# Patient Record
Sex: FEMALE | Race: WHITE | Employment: FULL TIME | URBAN - METROPOLITAN AREA
[De-identification: names, ages, dates, MRNs, and addresses within clinical notes are randomized per-mention and may not be internally consistent; named-entity substitution may affect disease eponyms.]

---

## 2018-12-04 ENCOUNTER — HOSPITAL ENCOUNTER (OUTPATIENT)
Dept: MAMMOGRAPHY | Age: 53
Discharge: HOME | End: 2018-12-06
Payer: COMMERCIAL

## 2018-12-04 DIAGNOSIS — Z12.31 SCREENING MAMMOGRAM, ENCOUNTER FOR: ICD-10-CM

## 2018-12-04 PROCEDURE — 77063 BREAST TOMOSYNTHESIS BI: CPT

## 2019-06-12 ENCOUNTER — TELEPHONE (OUTPATIENT)
Dept: ORTHOPEDIC SURGERY | Age: 54
End: 2019-06-12

## 2019-06-12 DIAGNOSIS — G56.03 BILATERAL CARPAL TUNNEL SYNDROME: Primary | ICD-10-CM

## 2019-06-13 ENCOUNTER — OFFICE VISIT (OUTPATIENT)
Dept: ORTHOPEDIC SURGERY | Age: 54
End: 2019-06-13
Payer: COMMERCIAL

## 2019-06-13 VITALS
DIASTOLIC BLOOD PRESSURE: 86 MMHG | WEIGHT: 225 LBS | SYSTOLIC BLOOD PRESSURE: 125 MMHG | RESPIRATION RATE: 16 BRPM | HEART RATE: 87 BPM | BODY MASS INDEX: 35.31 KG/M2 | HEIGHT: 67 IN

## 2019-06-13 DIAGNOSIS — M18.11 DEGENERATIVE ARTHRITIS OF THUMB, RIGHT: ICD-10-CM

## 2019-06-13 DIAGNOSIS — G56.02 LEFT CARPAL TUNNEL SYNDROME: Primary | ICD-10-CM

## 2019-06-13 PROCEDURE — 20526 THER INJECTION CARP TUNNEL: CPT | Performed by: ORTHOPAEDIC SURGERY

## 2019-06-13 PROCEDURE — 99203 OFFICE O/P NEW LOW 30 MIN: CPT | Performed by: ORTHOPAEDIC SURGERY

## 2019-06-13 RX ORDER — BETAMETHASONE SODIUM PHOSPHATE AND BETAMETHASONE ACETATE 3; 3 MG/ML; MG/ML
6 INJECTION, SUSPENSION INTRA-ARTICULAR; INTRALESIONAL; INTRAMUSCULAR; SOFT TISSUE ONCE
Status: COMPLETED | OUTPATIENT
Start: 2019-06-13 | End: 2019-06-13

## 2019-06-13 RX ADMIN — BETAMETHASONE SODIUM PHOSPHATE AND BETAMETHASONE ACETATE 6 MG: 3; 3 INJECTION, SUSPENSION INTRA-ARTICULAR; INTRALESIONAL; INTRAMUSCULAR; SOFT TISSUE at 08:43

## 2019-06-13 SDOH — HEALTH STABILITY: MENTAL HEALTH: HOW OFTEN DO YOU HAVE A DRINK CONTAINING ALCOHOL?: MONTHLY OR LESS

## 2019-06-13 NOTE — PROGRESS NOTES
Department of Orthopedic Surgery  History and Physical      CHIEF COMPLAINT: left hand numbness and tingling, right thumb pain    HISTORY OF PRESENT ILLNESS:                The patient is a RHD 48 y.o. female who presents with left hand numbness and tingling and right thumb pain. Patient states for the last 2 to 3 months she has had numbness and tingling and pain to her left hand. She also reports pain. She reports her symptoms are only at night. She does have nightly nocturnal symptoms. She has tried bracing with minimal improvement of her symptoms. She also reports occasional pain to the radial aspect of her MCP of her thumb. She reports no injury. She reports no numbness or tingling on the right. She is an . Patient had an EMG nerve conduction study test.  This is dated April 18, 2019. Right median nerve sensory latency was 3.40 and left was 4.15. Left median nerve motor latency was 4.65 and right was 4.05. On the EMG portion of the exam there was 1+ PSW to the left APB. This is consistent with left severe CTS and right mild CTS. Past Medical History:        Diagnosis Date    Arthritis     right knee      Past Surgical History:        Procedure Laterality Date    BREAST CYST EXCISION      HYSTERECTOMY      2011    KNEE ARTHROSCOPY      KNEE SURGERY Right      Current Medications:   No current facility-administered medications for this visit. Allergies:  Patient has no known allergies. Social History:   TOBACCO:   reports that she has never smoked. She has never used smokeless tobacco.  ETOH:   reports that she drinks alcohol. DRUGS:   has no drug history on file. ACTIVITIES OF DAILY LIVING:    OCCUPATION:    Family History:   No family history on file.     REVIEW OF SYSTEMS:  CONSTITUTIONAL:  negative  EYES:  negative  HEENT:  negative  RESPIRATORY:  negative  CARDIOVASCULAR:  negative  GASTROINTESTINAL:  negative  GENITOURINARY:  negative  INTEGUMENT/BREAST: negative  HEMATOLOGIC/LYMPHATIC:  negative  ALLERGIC/IMMUNOLOGIC:  negative  ENDOCRINE:  negative  MUSCULOSKELETAL:  pain  NEUROLOGICAL:  N/T  BEHAVIOR/PSYCH:  negative    PHYSICAL EXAM:    VITALS:  Resp 16   Ht 5' 7\" (1.702 m)   Wt 225 lb (102.1 kg)   BMI 35.24 kg/m²   CONSTITUTIONAL:  awake, alert, cooperative, no apparent distress, and appears stated age  EYES:  Lids and lashes normal, pupils equal, round and reactive to light, extra ocular muscles intact, sclera clear, conjunctiva normal  ENT:  Normocephalic, without obvious abnormality, atraumatic, sinuses nontender on palpation, external ears without lesions, oral pharynx with moist mucus membranes, tonsils without erythema or exudates, gums normal and good dentition. NECK:  Supple, symmetrical, trachea midline, no adenopathy, thyroid symmetric, not enlarged and no tenderness, skin normal  NEUROLOGIC:  Awake, alert, oriented to name, place and time. Cranial nerves II-XII are grossly intact. Motor is 5 out of 5 bilaterally. Sensory is intact.  gait is normal.  MUSCULOSKELETAL:    Left upper extremity: Nontender about the shoulder elbow good range of motion. Negative Tinel's of the cubital tunnel, negative Tinel's of the carpal tunnel, positive Fred's, negative Finkelstein's, negative CMC grind, negative tenderness over the A1 pulleys with no active triggering. Negative Wartenberg's and cross finger testing. APB strength 5 out of 5 with no atrophy. Full flexion-extension of the fingers. Median, ulnar, radial nerves intact light touch. Brisk capillary refill. Gross motor 5 out of 5. Right upper extremity: Nontender about the shoulder elbow good range of motion. Negative Tinel's of the cubital tunnel, negative Tinel's of the carpal tunnel, negative Fred's, negative Finkelstein's, negative CMC grind, negative tenderness over the A1 pulleys with no active triggering. Mild tenderness over the radial aspect of the thumb MCP joint.  No

## 2019-11-08 ENCOUNTER — HOSPITAL ENCOUNTER (OUTPATIENT)
Age: 54
Discharge: HOME | End: 2019-11-10
Payer: COMMERCIAL

## 2019-11-08 LAB
ALBUMIN SERPL-MCNC: 4.5 G/DL (ref 3.5–5.2)
ALP BLD-CCNC: 72 U/L (ref 35–104)
ALT SERPL-CCNC: 36 U/L (ref 0–32)
ANION GAP SERPL CALCULATED.3IONS-SCNC: 11 MMOL/L (ref 7–16)
AST SERPL-CCNC: 24 U/L (ref 0–31)
BASOPHILS ABSOLUTE: 0.03 E9/L (ref 0–0.2)
BASOPHILS RELATIVE PERCENT: 0.5 % (ref 0–2)
BILIRUB SERPL-MCNC: 0.6 MG/DL (ref 0–1.2)
BUN BLDV-MCNC: 15 MG/DL (ref 6–20)
CALCIUM SERPL-MCNC: 9.6 MG/DL (ref 8.6–10.2)
CHLORIDE BLD-SCNC: 101 MMOL/L (ref 98–107)
CHOLESTEROL, TOTAL: 215 MG/DL (ref 0–199)
CO2: 24 MMOL/L (ref 22–29)
CREAT SERPL-MCNC: 0.8 MG/DL (ref 0.5–1)
EOSINOPHILS ABSOLUTE: 0.14 E9/L (ref 0.05–0.5)
EOSINOPHILS RELATIVE PERCENT: 2.3 % (ref 0–6)
GFR AFRICAN AMERICAN: >60
GFR NON-AFRICAN AMERICAN: >60 ML/MIN/1.73
GLUCOSE BLD-MCNC: 98 MG/DL (ref 74–99)
HCT VFR BLD CALC: 38.2 % (ref 34–48)
HDLC SERPL-MCNC: 49 MG/DL
HEMOGLOBIN: 12.3 G/DL (ref 11.5–15.5)
IMMATURE GRANULOCYTES #: 0.02 E9/L
IMMATURE GRANULOCYTES %: 0.3 % (ref 0–5)
LDL CHOLESTEROL CALCULATED: 142 MG/DL (ref 0–99)
LYMPHOCYTES ABSOLUTE: 1.04 E9/L (ref 1.5–4)
LYMPHOCYTES RELATIVE PERCENT: 17.2 % (ref 20–42)
MCH RBC QN AUTO: 29.7 PG (ref 26–35)
MCHC RBC AUTO-ENTMCNC: 32.2 % (ref 32–34.5)
MCV RBC AUTO: 92.3 FL (ref 80–99.9)
MONOCYTES ABSOLUTE: 0.5 E9/L (ref 0.1–0.95)
MONOCYTES RELATIVE PERCENT: 8.3 % (ref 2–12)
NEUTROPHILS ABSOLUTE: 4.3 E9/L (ref 1.8–7.3)
NEUTROPHILS RELATIVE PERCENT: 71.4 % (ref 43–80)
PDW BLD-RTO: 12.7 FL (ref 11.5–15)
PLATELET # BLD: 273 E9/L (ref 130–450)
PMV BLD AUTO: 11.5 FL (ref 7–12)
POTASSIUM SERPL-SCNC: 4.2 MMOL/L (ref 3.5–5)
RBC # BLD: 4.14 E12/L (ref 3.5–5.5)
SODIUM BLD-SCNC: 136 MMOL/L (ref 132–146)
TOTAL PROTEIN: 9.1 G/DL (ref 6.4–8.3)
TRIGL SERPL-MCNC: 119 MG/DL (ref 0–149)
TSH SERPL DL<=0.05 MIU/L-ACNC: 3.06 UIU/ML (ref 0.27–4.2)
VITAMIN D 25-HYDROXY: 20 NG/ML (ref 30–100)
VLDLC SERPL CALC-MCNC: 24 MG/DL
WBC # BLD: 6 E9/L (ref 4.5–11.5)

## 2019-11-08 PROCEDURE — 85025 COMPLETE CBC W/AUTO DIFF WBC: CPT

## 2019-11-08 PROCEDURE — 84443 ASSAY THYROID STIM HORMONE: CPT

## 2019-11-08 PROCEDURE — 80053 COMPREHEN METABOLIC PANEL: CPT

## 2019-11-08 PROCEDURE — 82306 VITAMIN D 25 HYDROXY: CPT

## 2019-11-08 PROCEDURE — 80061 LIPID PANEL: CPT

## 2019-12-06 ENCOUNTER — HOSPITAL ENCOUNTER (OUTPATIENT)
Dept: MAMMOGRAPHY | Age: 54
Discharge: HOME | End: 2019-12-08
Payer: COMMERCIAL

## 2019-12-06 DIAGNOSIS — Z12.31 VISIT FOR SCREENING MAMMOGRAM: ICD-10-CM

## 2019-12-06 PROCEDURE — 77063 BREAST TOMOSYNTHESIS BI: CPT

## 2020-01-09 ENCOUNTER — OFFICE VISIT (OUTPATIENT)
Dept: ORTHOPEDIC SURGERY | Age: 55
End: 2020-01-09
Payer: COMMERCIAL

## 2020-01-09 VITALS — HEART RATE: 98 BPM | SYSTOLIC BLOOD PRESSURE: 146 MMHG | RESPIRATION RATE: 18 BRPM | DIASTOLIC BLOOD PRESSURE: 98 MMHG

## 2020-01-09 PROCEDURE — 99214 OFFICE O/P EST MOD 30 MIN: CPT | Performed by: ORTHOPAEDIC SURGERY

## 2020-01-09 PROCEDURE — 20550 NJX 1 TENDON SHEATH/LIGAMENT: CPT | Performed by: ORTHOPAEDIC SURGERY

## 2020-01-09 PROCEDURE — 20526 THER INJECTION CARP TUNNEL: CPT | Performed by: ORTHOPAEDIC SURGERY

## 2020-01-09 RX ORDER — BETAMETHASONE SODIUM PHOSPHATE AND BETAMETHASONE ACETATE 3; 3 MG/ML; MG/ML
18 INJECTION, SUSPENSION INTRA-ARTICULAR; INTRALESIONAL; INTRAMUSCULAR; SOFT TISSUE ONCE
Status: COMPLETED | OUTPATIENT
Start: 2020-01-09 | End: 2020-01-09

## 2020-01-09 RX ADMIN — BETAMETHASONE SODIUM PHOSPHATE AND BETAMETHASONE ACETATE 18 MG: 3; 3 INJECTION, SUSPENSION INTRA-ARTICULAR; INTRALESIONAL; INTRAMUSCULAR; SOFT TISSUE at 15:46

## 2020-01-09 NOTE — PROGRESS NOTES
capillary refill. Gross motor 5 out of 5. DATA:    CBC:   Lab Results   Component Value Date    WBC 6.0 11/08/2019    RBC 4.14 11/08/2019    HGB 12.3 11/08/2019    HCT 38.2 11/08/2019    MCV 92.3 11/08/2019    MCH 29.7 11/08/2019    MCHC 32.2 11/08/2019    RDW 12.7 11/08/2019     11/08/2019    MPV 11.5 11/08/2019     PT/INR:  No results found for: PROTIME, INR    IMPRESSION:  · Left carpal tunnel syndrome  · Left middle and ring finger trigger    PLAN:  Discussed findings with the patient. Discussed conservative and surgical management of the patient. Recommended a course of hand therapy. Patient would also like to proceed with cortisone injections at today's visit along with scheduling surgery. Cortisone injections were provided to the left carpal tunnel, left middle and ring finger A1 pulley. We will plan for a left carpal tunnel release and possible left middle and ring finger trigger release. Post operative course explained to the patient. Patient would like to proceed. All questions answered. Procedure Note Carpal Tunnel Injection    The left carpal tunnel was identified as the injection site. The risk and benefits of a cortisone injection were explained and the patient consented to the injection. Under sterile conditions, the carpal canal was injected with a mixture of 1 mL of 1% Lidocaine and 1 mL of 6 mg/mL Betamethasone without complication. A sterile bandage was applied. Procedure Note Trigger Finger Injection    The left Middle finger A1 pulley and Ring finger A1 pulley was identified as the injection site. The risk and benefits of a cortisone injection were explained and the patient consented to the injection. Under sterile conditions, the digit was injected with a mixture of 1 mL of 1% Lidocaine and 1 mL of 6 mg/mL Betamethasone without complication. A sterile bandage was applied.     I explained the risks, benefits, alternatives and complications of surgery with the patient including but not limited to the risks of infection, possible damage to nerves, vessels, or tendons, stiffness, loss of range of motion, scar sensitivity, wound healing complications, worsening symptoms, possible need for therapy, as well as the possible need further surgery and unanticipated complications. The patient voiced understanding and all questions were answered. The patient elected to proceed with surgical intervention. I have seen and evaluated the patient and agree with the above assessment and plan on today's visit. I have performed the key components of the history and physical examination with significant findings of left carpal tunnel syndrome and trigger fingers. I concur with the findings and plan as documented.     Gilma Doyle MD  1/9/2020

## 2020-01-17 ENCOUNTER — PREP FOR PROCEDURE (OUTPATIENT)
Dept: ORTHOPEDIC SURGERY | Age: 55
End: 2020-01-17

## 2020-01-17 RX ORDER — SODIUM CHLORIDE 0.9 % (FLUSH) 0.9 %
10 SYRINGE (ML) INJECTION EVERY 12 HOURS SCHEDULED
Status: CANCELLED | OUTPATIENT
Start: 2020-01-17

## 2020-01-17 RX ORDER — SODIUM CHLORIDE 0.9 % (FLUSH) 0.9 %
10 SYRINGE (ML) INJECTION PRN
Status: CANCELLED | OUTPATIENT
Start: 2020-01-17

## 2020-01-17 RX ORDER — SODIUM CHLORIDE 9 MG/ML
INJECTION, SOLUTION INTRAVENOUS CONTINUOUS
Status: CANCELLED | OUTPATIENT
Start: 2020-01-17

## 2020-01-21 ENCOUNTER — EVALUATION (OUTPATIENT)
Dept: OCCUPATIONAL THERAPY | Age: 55
End: 2020-01-21
Payer: COMMERCIAL

## 2020-01-21 PROCEDURE — 97530 THERAPEUTIC ACTIVITIES: CPT | Performed by: OCCUPATIONAL THERAPIST

## 2020-01-21 PROCEDURE — 97165 OT EVAL LOW COMPLEX 30 MIN: CPT | Performed by: OCCUPATIONAL THERAPIST

## 2020-01-21 NOTE — PROGRESS NOTES
HEP/Ed previously given. Patient understands diagnosis/prognosis and consents to treatment, plan and goals: [x] Yes    [] No       Electronically signed by: Sanjiv Aquino OT/RISHI   [id number]      EDVRTLMANISH'CONSTANZA Certification / Comments     Frequency/Duration 1x / week for 4 visits. Certification period From: 1-21-20  To: 4-21-20    I have reviewed the Plan of Care established for skilled therapy services and certify that the services are required and that they will be provided while the patient is under my care. Physician's Comments/Revisions:         Physician's Printed Name:  Dr Philomena Lora                                         Physician's Signature:                                                               Date:       Please review Patient's OT evaluation and if you agree sign/date and fax back to us at our [facility]  fax # [phone].

## 2020-01-23 PROBLEM — G56.02 LEFT CARPAL TUNNEL SYNDROME: Status: ACTIVE | Noted: 2020-01-23

## 2020-03-06 ENCOUNTER — HOSPITAL ENCOUNTER (OUTPATIENT)
Age: 55
Setting detail: OUTPATIENT SURGERY
Discharge: HOME | End: 2020-03-06
Attending: ORTHOPAEDIC SURGERY | Admitting: ORTHOPAEDIC SURGERY
Payer: COMMERCIAL

## 2020-03-06 ENCOUNTER — ANESTHESIA (OUTPATIENT)
Dept: OPERATING ROOM | Age: 55
End: 2020-03-06
Payer: COMMERCIAL

## 2020-03-06 ENCOUNTER — ANESTHESIA EVENT (OUTPATIENT)
Dept: OPERATING ROOM | Age: 55
End: 2020-03-06
Payer: COMMERCIAL

## 2020-03-06 VITALS
OXYGEN SATURATION: 96 % | RESPIRATION RATE: 14 BRPM | WEIGHT: 225 LBS | HEART RATE: 96 BPM | TEMPERATURE: 96.8 F | HEIGHT: 67 IN | DIASTOLIC BLOOD PRESSURE: 79 MMHG | SYSTOLIC BLOOD PRESSURE: 126 MMHG | BODY MASS INDEX: 35.31 KG/M2

## 2020-03-06 VITALS — SYSTOLIC BLOOD PRESSURE: 104 MMHG | OXYGEN SATURATION: 99 % | DIASTOLIC BLOOD PRESSURE: 61 MMHG

## 2020-03-06 PROCEDURE — 6360000002 HC RX W HCPCS: Performed by: NURSE ANESTHETIST, CERTIFIED REGISTERED

## 2020-03-06 PROCEDURE — 7100000010 HC PHASE II RECOVERY - FIRST 15 MIN: Performed by: ORTHOPAEDIC SURGERY

## 2020-03-06 PROCEDURE — 3600000012 HC SURGERY LEVEL 2 ADDTL 15MIN: Performed by: ORTHOPAEDIC SURGERY

## 2020-03-06 PROCEDURE — 6360000002 HC RX W HCPCS: Performed by: PHYSICIAN ASSISTANT

## 2020-03-06 PROCEDURE — 2709999900 HC NON-CHARGEABLE SUPPLY: Performed by: ORTHOPAEDIC SURGERY

## 2020-03-06 PROCEDURE — 64721 CARPAL TUNNEL SURGERY: CPT | Performed by: ORTHOPAEDIC SURGERY

## 2020-03-06 PROCEDURE — 2580000003 HC RX 258: Performed by: PHYSICIAN ASSISTANT

## 2020-03-06 PROCEDURE — 3700000001 HC ADD 15 MINUTES (ANESTHESIA): Performed by: ORTHOPAEDIC SURGERY

## 2020-03-06 PROCEDURE — 2500000003 HC RX 250 WO HCPCS: Performed by: ORTHOPAEDIC SURGERY

## 2020-03-06 PROCEDURE — 3600000002 HC SURGERY LEVEL 2 BASE: Performed by: ORTHOPAEDIC SURGERY

## 2020-03-06 PROCEDURE — 3700000000 HC ANESTHESIA ATTENDED CARE: Performed by: ORTHOPAEDIC SURGERY

## 2020-03-06 PROCEDURE — 7100000011 HC PHASE II RECOVERY - ADDTL 15 MIN: Performed by: ORTHOPAEDIC SURGERY

## 2020-03-06 RX ORDER — LIDOCAINE HYDROCHLORIDE AND EPINEPHRINE 10; 10 MG/ML; UG/ML
INJECTION, SOLUTION INFILTRATION; PERINEURAL PRN
Status: DISCONTINUED | OUTPATIENT
Start: 2020-03-06 | End: 2020-03-06 | Stop reason: ALTCHOICE

## 2020-03-06 RX ORDER — HYDROCODONE BITARTRATE AND ACETAMINOPHEN 5; 325 MG/1; MG/1
1 TABLET ORAL
Status: DISCONTINUED | OUTPATIENT
Start: 2020-03-06 | End: 2020-03-06 | Stop reason: HOSPADM

## 2020-03-06 RX ORDER — SODIUM CHLORIDE 9 MG/ML
INJECTION, SOLUTION INTRAVENOUS CONTINUOUS
Status: DISCONTINUED | OUTPATIENT
Start: 2020-03-06 | End: 2020-03-06 | Stop reason: HOSPADM

## 2020-03-06 RX ORDER — SODIUM CHLORIDE 0.9 % (FLUSH) 0.9 %
10 SYRINGE (ML) INJECTION PRN
Status: DISCONTINUED | OUTPATIENT
Start: 2020-03-06 | End: 2020-03-06 | Stop reason: HOSPADM

## 2020-03-06 RX ORDER — PROPOFOL 10 MG/ML
INJECTION, EMULSION INTRAVENOUS PRN
Status: DISCONTINUED | OUTPATIENT
Start: 2020-03-06 | End: 2020-03-06 | Stop reason: SDUPTHER

## 2020-03-06 RX ORDER — FENTANYL CITRATE 50 UG/ML
INJECTION, SOLUTION INTRAMUSCULAR; INTRAVENOUS PRN
Status: DISCONTINUED | OUTPATIENT
Start: 2020-03-06 | End: 2020-03-06 | Stop reason: SDUPTHER

## 2020-03-06 RX ORDER — MIDAZOLAM HYDROCHLORIDE 1 MG/ML
INJECTION INTRAMUSCULAR; INTRAVENOUS PRN
Status: DISCONTINUED | OUTPATIENT
Start: 2020-03-06 | End: 2020-03-06 | Stop reason: SDUPTHER

## 2020-03-06 RX ORDER — HYDROCODONE BITARTRATE AND ACETAMINOPHEN 5; 325 MG/1; MG/1
1 TABLET ORAL EVERY 6 HOURS PRN
Qty: 12 TABLET | Refills: 0 | Status: SHIPPED | OUTPATIENT
Start: 2020-03-06 | End: 2020-03-13

## 2020-03-06 RX ORDER — PROPOFOL 10 MG/ML
INJECTION, EMULSION INTRAVENOUS CONTINUOUS PRN
Status: DISCONTINUED | OUTPATIENT
Start: 2020-03-06 | End: 2020-03-06 | Stop reason: SDUPTHER

## 2020-03-06 RX ORDER — CEFAZOLIN SODIUM 2 G/50ML
2 SOLUTION INTRAVENOUS
Status: COMPLETED | OUTPATIENT
Start: 2020-03-06 | End: 2020-03-06

## 2020-03-06 RX ORDER — SODIUM CHLORIDE 0.9 % (FLUSH) 0.9 %
10 SYRINGE (ML) INJECTION EVERY 12 HOURS SCHEDULED
Status: DISCONTINUED | OUTPATIENT
Start: 2020-03-06 | End: 2020-03-06 | Stop reason: HOSPADM

## 2020-03-06 RX ADMIN — PROPOFOL 100 MG: 10 INJECTION, EMULSION INTRAVENOUS at 11:27

## 2020-03-06 RX ADMIN — FENTANYL CITRATE 50 MCG: 50 INJECTION, SOLUTION INTRAMUSCULAR; INTRAVENOUS at 11:27

## 2020-03-06 RX ADMIN — SODIUM CHLORIDE: 9 INJECTION, SOLUTION INTRAVENOUS at 11:25

## 2020-03-06 RX ADMIN — FENTANYL CITRATE 25 MCG: 50 INJECTION, SOLUTION INTRAMUSCULAR; INTRAVENOUS at 11:34

## 2020-03-06 RX ADMIN — MIDAZOLAM 2 MG: 1 INJECTION INTRAMUSCULAR; INTRAVENOUS at 11:25

## 2020-03-06 RX ADMIN — CEFAZOLIN SODIUM 2 G: 2 SOLUTION INTRAVENOUS at 11:25

## 2020-03-06 RX ADMIN — PROPOFOL 100 MCG/KG/MIN: 10 INJECTION, EMULSION INTRAVENOUS at 11:27

## 2020-03-06 ASSESSMENT — PULMONARY FUNCTION TESTS
PIF_VALUE: 1
PIF_VALUE: 0
PIF_VALUE: 1

## 2020-03-06 NOTE — BRIEF OP NOTE
Brief Postoperative Note  ______________________________________________________________    Patient: Valentina Neff  YOB: 1965  MRN: [id number]  Date of Procedure: 3/6/2020    Pre-Op Diagnosis: LEFT CARPAL TUNNEL SYNDROME, ACQUIRED TRIGGER FINGER OF LEFT MIDDLE AND RING FINGERS    Post-Op Diagnosis: Same       Procedure(s):  LEFT CARPAL TUNNEL RELEASE    Anesthesia: Monitor Anesthesia Care    Surgeon(s):  Basia Rainey MD    Assistant: Howard Burden    Estimated Blood Loss (mL): less than 50     Complications: None    Specimens:   * No specimens in log *    Implants:  * No implants in log *      Drains: * No LDAs found *    Findings: see op note    CHINO Martinez  Date: 3/6/2020  Time: 11:37 AM

## 2020-03-06 NOTE — ANESTHESIA PRE PROCEDURE
Department of Anesthesiology  Preprocedure Note       Name:  Tim Menon   Age:  47 y.o.  :  1965                                          MRN:  [id number]         Date:  3/6/2020      Surgeon: Libby Thomas):  Ayo Bashir MD    Procedure: LEFT CARPAL TUNNEL RELEASE (Left )  POSS LEFT MIDDLE AND RING FINGER TRIGGER RELEASE (Left )    Medications prior to admission:   Prior to Admission medications    Not on File       Current medications:    Current Facility-Administered Medications   Medication Dose Route Frequency Provider Last Rate Last Dose    0.9 % sodium chloride infusion   Intravenous Continuous JeanCHINO Horne        ceFAZolin (ANCEF) 2 g in dextrose 3 % 50 mL IVPB (duplex)  2 g Intravenous On Call to [address] [place]        sodium chloride flush 0.9 % injection 10 mL  10 mL Intravenous 2 times per day Jean CHINO Finnegan        sodium chloride flush 0.9 % injection 10 mL  10 mL Intravenous PRN JeanCHINO Horne           Allergies:  No Known Allergies    Problem List:    Patient Active Problem List   Diagnosis Code    Left carpal tunnel syndrome G56.02       Past Medical History:        Diagnosis Date    Arthritis     right knee     Carpal tunnel syndrome, left        Past Surgical History:        Procedure Laterality Date    BREAST CYST EXCISION      HYSTERECTOMY      2011    KNEE ARTHROSCOPY      KNEE SURGERY Right        Social History:    Social History     Tobacco Use    Smoking status: Never Smoker    Smokeless tobacco: Never Used   Substance Use Topics    Alcohol use: Yes     Frequency: Monthly or less     Comment: occasional                                 Counseling given: Not Answered      Vital Signs (Current):   Vitals:    20 1134 20 0900   BP:  (!) 146/93   Pulse:  87   Resp:  14   SpO2:  98%   Weight: 225 lb (102.1 kg)    Height: 5' 7\" (1.702 m)                                               BP Readings from Last 3 Encounters:   20 (!) 146/93

## 2020-03-06 NOTE — OP NOTE
DATE OF PROCEDURE: 3/6/2020  SURGEON: Ashley Bolton M.D.  ASSISTANT: Dr Sera Dominguez orthopedic resident  PREOPERATIVE DIAGNOSIS: left carpal tunnel syndrome. POSTOPERATIVE DIAGNOSIS: left carpal tunnel syndrome. OPERATION: left carpal tunnel release. ANESTHESIA:  1. Monitored anesthesia care  2. Local anesthetic by surgeon consisting of 10cc of 1% lidocaine with epinephrine  ESTIMATED BLOOD LOSS: Minimal  COMPLICATIONS: None. TOTAL TOURNIQUET TIME: Approximately 6 minutes, 250 mm brachial tourniquet. DISPOSITION: The patient was stable throughout the procedure. FINDINGS:  1. Evidence of median nerve compression beneath the transverse carpal  ligament. It was adequately decompressed with release of the transverse  carpal ligament. 2. Status post decompression, the median nerve was fully decompressed  throughout its course including distal forearm fascia through the carpal  tunnel to its trifurcation distally  OPERATIVE INDICATION: The patient is a very pleasant patient with  persistent and recalcitrant of carpal tunnel syndrome. After failing  conservative management, she wished to proceed with carpal tunnel release. Risks, benefits, alternatives, and complications were fully explained to her  including, but not limited to, the risk of infection, damage to  nerves/vessels/tendons, failure to relieve her symptoms, worsening symptoms,  recurrence of symptoms, pillar pain, thenar pain, hypothenar pain, scar sensitivity as well as unforeseen complications. She voiced her understanding and wished to proceed. PROCEDURE IN DETAIL: The patient was identified in the holding area. The  left hand was identified as the operative site. She was then seen by  Anesthesia, taken to the operating room, placed supine on the table, and  underwent monitored anesthesia care per Anesthesia Department.  Under sterile  conditions, approximately 10 mL of 1:1 mixture of 1% lidocaine with epinephrine were infiltrated over the

## 2020-03-06 NOTE — H&P
and FDS intact to all fingers. Median, ulnar, radial nerves intact light touch. Diminished sensation to the median n distribution. Brisk capillary refill. Gross motor 5 out of 5.        DATA:    CBC:         Lab Results   Component Value Date     WBC 6.0 11/08/2019     RBC 4.14 11/08/2019     HGB 12.3 11/08/2019     HCT 38.2 11/08/2019     MCV 92.3 11/08/2019     MCH 29.7 11/08/2019     MCHC 32.2 11/08/2019     RDW 12.7 11/08/2019      11/08/2019     MPV 11.5 11/08/2019      PT/INR:  No results found for: PROTIME, INR     IMPRESSION:  · Left carpal tunnel syndrome  · Left middle and ring finger trigger     PLAN:  Discussed findings with the patient. Discussed conservative and surgical management of the patient. Recommended a course of hand therapy. Patient would also like to proceed with cortisone injections at today's visit along with scheduling surgery. Cortisone injections were provided to the left carpal tunnel, left middle and ring finger A1 pulley. We will plan for a left carpal tunnel release and possible left middle and ring finger trigger release. Post operative course explained to the patient. Patient would like to proceed. All questions answered.       I explained the risks, benefits, alternatives and complications of surgery with the patient including but not limited to the risks of infection, possible damage to nerves, vessels, or tendons, stiffness, loss of range of motion, scar sensitivity, wound healing complications, worsening symptoms, possible need for therapy, as well as the possible need further surgery and unanticipated complications. The patient voiced understanding and all questions were answered. The patient elected to proceed with surgical intervention.      I have seen and evaluated the patient and agree with the above assessment and plan on today's visit.  I have performed the key components of the history and physical examination with significant findings of left carpal tunnel syndrome and trigger fingers. I concur with the findings and plan as documented    History and Physical Update     Patient was seen and examined. Patient's history and physical was reviewed with the patient. There has been no significant interval changes.

## 2020-03-06 NOTE — PROGRESS NOTES
Discharge instructions provided. Verbalized understanding. denies pain.
Nourishment provided. Vs stable, denies pain. Family at bedside.
products on the day of surgery    [x] If not already done, you can expect a call from registration    [x] You can expect a call the business day prior to procedure to notify you if your arrival time changes    [] If you receive a survey after surgery we would greatly appreciate your comments    [] Parent/guardian of a minor must accompany their child and remain on the premises  the entire time they are under our care     [] Pediatric patients may bring favorite toy, blanket or comfort item with them    [] A caregiver or family member must remain with the patient during their stay if they are mentally handicapped, have dementia, disoriented or unable to use a call light or would be a safety concern if left unattended    [x] Please notify surgeon if you develop any illness between now and time of surgery (cold, cough, sore throat, fever, nausea, vomiting) or any signs of infections  including skin, wounds, and dental.    [x]  [facility] is available to fill your prescription here on your day of surgery, ask your preop nurse for details    [] Other instructions  EDUCATIONAL MATERIALS PROVIDED:    [] PAT Preoperative Education Packet/Booklet     [] Medication List    [] Fluoroscopy Information Pamphlet    [] Transfusion bracelet applied with instructions    [] Joint replacement video reviewed    [] Shower with soap, lather and rinse well, and use CHG wipes provided the evening before surgery as instructed

## 2020-03-13 ENCOUNTER — OFFICE VISIT (OUTPATIENT)
Dept: ORTHOPEDIC SURGERY | Age: 55
End: 2020-03-13

## 2020-03-13 VITALS — DIASTOLIC BLOOD PRESSURE: 86 MMHG | HEART RATE: 92 BPM | SYSTOLIC BLOOD PRESSURE: 138 MMHG | RESPIRATION RATE: 14 BRPM

## 2020-03-13 PROCEDURE — 99024 POSTOP FOLLOW-UP VISIT: CPT | Performed by: PHYSICIAN ASSISTANT

## 2020-03-13 NOTE — PROGRESS NOTES
HPI: Patient presents today POD #7 s/p left carpal tunnel release. Overall she is doing well. Preoperative symptoms have improved. Physical Exam: incision c/d/i with sutures in place. No erythema or signs of infection. Full flexion and extension of the fingers. - pillar pain. Brisk capillary refill. Otherwise NVI. Assessment: POD #7 s/p left endoscopic carpal tunnel release    Plan:   Sutures removed today in the office. Scar management advised. Activity restrictions reviewed with the patient. HEP and ROM exercises demonstrated to the patient. Follow up in 1 month if needed. All questions answered.

## 2020-06-08 ENCOUNTER — OFFICE VISIT (OUTPATIENT)
Dept: ORTHOPEDIC SURGERY | Age: 55
End: 2020-06-08
Payer: COMMERCIAL

## 2020-06-08 VITALS — WEIGHT: 225 LBS | HEIGHT: 67 IN | TEMPERATURE: 97 F | BODY MASS INDEX: 35.31 KG/M2 | RESPIRATION RATE: 20 BRPM

## 2020-06-08 PROCEDURE — 99212 OFFICE O/P EST SF 10 MIN: CPT | Performed by: ORTHOPAEDIC SURGERY

## 2021-03-12 ENCOUNTER — IMMUNIZATION (OUTPATIENT)
Dept: PRIMARY CARE CLINIC | Age: 56
End: 2021-03-12
Payer: COMMERCIAL

## 2021-03-12 PROCEDURE — 0011A COVID-19, MODERNA VACCINE 100MCG/0.5ML DOSE: CPT | Performed by: NURSE PRACTITIONER

## 2021-03-12 PROCEDURE — 91301 COVID-19, MODERNA VACCINE 100MCG/0.5ML DOSE: CPT | Performed by: NURSE PRACTITIONER

## 2021-04-12 ENCOUNTER — IMMUNIZATION (OUTPATIENT)
Dept: PRIMARY CARE CLINIC | Age: 56
End: 2021-04-12
Payer: COMMERCIAL

## 2021-04-12 PROCEDURE — 91301 COVID-19, MODERNA VACCINE 100MCG/0.5ML DOSE: CPT | Performed by: NURSE PRACTITIONER

## 2021-04-12 PROCEDURE — 0012A COVID-19, MODERNA VACCINE 100MCG/0.5ML DOSE: CPT | Performed by: NURSE PRACTITIONER

## 2021-04-28 ENCOUNTER — HOSPITAL ENCOUNTER (OUTPATIENT)
Dept: MAMMOGRAPHY | Age: 56
Discharge: HOME | End: 2021-04-30
Payer: COMMERCIAL

## 2021-04-28 VITALS — HEIGHT: 67 IN | BODY MASS INDEX: 35.31 KG/M2 | WEIGHT: 225 LBS

## 2021-04-28 DIAGNOSIS — Z12.31 ENCOUNTER FOR SCREENING MAMMOGRAM FOR MALIGNANT NEOPLASM OF BREAST: ICD-10-CM

## 2021-04-28 PROCEDURE — 77063 BREAST TOMOSYNTHESIS BI: CPT

## 2021-05-05 ENCOUNTER — HOSPITAL ENCOUNTER (OUTPATIENT)
Dept: ULTRASOUND IMAGING | Age: 56
Discharge: HOME | End: 2021-05-05
Payer: COMMERCIAL

## 2021-05-05 ENCOUNTER — HOSPITAL ENCOUNTER (OUTPATIENT)
Dept: MAMMOGRAPHY | Age: 56
Discharge: HOME | End: 2021-05-07
Payer: COMMERCIAL

## 2021-05-05 DIAGNOSIS — R92.8 ABNORMAL MAMMOGRAM: ICD-10-CM

## 2021-05-05 PROCEDURE — G0279 TOMOSYNTHESIS, MAMMO: HCPCS

## 2021-05-05 PROCEDURE — 76641 ULTRASOUND BREAST COMPLETE: CPT

## 2021-11-09 ENCOUNTER — HOSPITAL ENCOUNTER (OUTPATIENT)
Dept: MAMMOGRAPHY | Age: 56
Discharge: HOME | End: 2021-11-11
Payer: COMMERCIAL

## 2021-11-09 ENCOUNTER — HOSPITAL ENCOUNTER (OUTPATIENT)
Dept: ULTRASOUND IMAGING | Age: 56
End: 2021-11-09
Payer: COMMERCIAL

## 2021-11-09 VITALS — BODY MASS INDEX: 34.53 KG/M2 | WEIGHT: 220 LBS | HEIGHT: 67 IN

## 2021-11-09 DIAGNOSIS — R92.8 ABNORMAL MAMMOGRAM: ICD-10-CM

## 2021-11-09 DIAGNOSIS — Z09 FOLLOW-UP EXAM, 3-6 MONTHS SINCE PREVIOUS EXAM: ICD-10-CM

## 2021-11-09 PROCEDURE — 77065 DX MAMMO INCL CAD UNI: CPT

## 2022-09-28 ENCOUNTER — HOSPITAL ENCOUNTER (OUTPATIENT)
Dept: MAMMOGRAPHY | Age: 57
Discharge: HOME | End: 2022-09-30
Payer: COMMERCIAL

## 2022-09-28 DIAGNOSIS — Z12.31 ENCOUNTER FOR SCREENING MAMMOGRAM FOR MALIGNANT NEOPLASM OF BREAST: ICD-10-CM

## 2022-09-28 PROCEDURE — 77063 BREAST TOMOSYNTHESIS BI: CPT

## 2022-09-30 ENCOUNTER — TELEPHONE (OUTPATIENT)
Dept: MAMMOGRAPHY | Age: 57
End: 2022-09-30

## 2022-09-30 NOTE — TELEPHONE ENCOUNTER
Order request faxed to Dr. Katrin Leo for right breast diagnostic mammogram and right breast ultrasound as recommended from mammogram performed at [address] [place] [address],[address] on 9/28/22. Successful fax confirmation.  BIRDIE CrawfordN, RN -Breast Nurse Navigator

## 2022-10-05 ENCOUNTER — TELEPHONE (OUTPATIENT)
Dept: MAMMOGRAPHY | Age: 57
End: 2022-10-05

## 2022-10-05 NOTE — TELEPHONE ENCOUNTER
Call to patient in reference to her mammogram performed at [facility] on 9/28/22. Instructed patient that the radiologist has recommended additional breast imaging in order to make a final determination and further recommendations. Patient verbalized understanding and is agreeable to proceed. Orders received per Dr. Merary Pimentel and scanned into media.  Patient scheduled for 10/14/22 at 9:15 am. BIRDIE RogelN, RN -Breast Nurse Navigator

## 2022-10-14 ENCOUNTER — HOSPITAL ENCOUNTER (OUTPATIENT)
Dept: MAMMOGRAPHY | Age: 57
Discharge: HOME | End: 2022-10-16
Payer: COMMERCIAL

## 2022-10-14 ENCOUNTER — HOSPITAL ENCOUNTER (OUTPATIENT)
Dept: ULTRASOUND IMAGING | Age: 57
Discharge: HOME | End: 2022-10-14
Payer: COMMERCIAL

## 2022-10-14 DIAGNOSIS — R92.8 ABNORMAL MAMMOGRAM: ICD-10-CM

## 2022-10-14 PROCEDURE — 76642 ULTRASOUND BREAST LIMITED: CPT

## 2022-10-14 PROCEDURE — G0279 TOMOSYNTHESIS, MAMMO: HCPCS

## 2023-09-30 NOTE — PROGRESS NOTES
April 12, 2023       Grecia Hogue MD  [address]  [address]  [facility] [address]  Via Fax: [phone]      Patient: Kasey Nuñez   YOB: 2018   Date of Visit: 4/12/2023       Dear Dr. Hogue:    I saw your patient, Kasey Nuñez, for an evaluation. Below are my notes for this visit with her.    If you have questions, please do not hesitate to call me.      Sincerely,        Dejon Fortune MD        CC: No Recipients  Dejon Fortune MD  4/12/2023 12:59 PM  Sign when Signing Visit  4/12/2023    Grecia Hogue MD    Chief Complaint   Patient presents with   • Consultation   • Chest Pain   • Tachycardia       Problem List Items Addressed This Visit    None  Visit Diagnoses     Chest pain, unspecified type    -  Primary    Relevant Orders    Electrocardiogram 12-Lead (Completed)    TRANSTHORACIC ECHO (TTE) COMPLETE (PEDS)    Tachycardia        Relevant Orders    Electrocardiogram 12-Lead (Completed)    TRANSTHORACIC ECHO (TTE) COMPLETE (PEDS)          HPI:    Kasey Nuñez is a 4 year old female who was accompanied by her mother at the [facility] for initial evaluation of chest pain and tachycardia.     She was seen by Dr. John on 4-11-23, when the following was noted:  Chief Complaint: Here today for Concerns (Rapid heart rate at dance. She went up to dance teacher and said her heart hurt- Hr-128bpm. Took Claritin today, Flonase and Benadryl last night.)   HPI  Kasey Nuñez is a 5YO female here with concern for rapid heart rate. It was necessary to utilize an independent historian ( mother ) during this office visit to provide a complete and reliable history due to the patient's age. Felt that heart rate was elevated at dance class, found to be 128 when measured. Mentions that her heart \"hurts\", now improved in the office. Pain was over right side of the chest. No issues with heart problems or racing heart in the past. Mentions possible feeling skipped beats.   No  changes in eating/drinking recently, no caffeine. Eating and drinking well, no vomiting/diarrhea. Mom was sick last week, now patient with cough over the last day or so. Heavier breather when getting cough. No fevers noted at home. Medications tried at home include Flonase and Benadryl and Claritin. No rashes.  Current Outpatient Medications   Azithromycin 200 MG/5ML PO Recon Susp 4ml day one then 2 ml days 2-5     Kasey has had right-sided chest pain that started 1 day ago in dance class, described as \"it hurts, like someone hit me, burning,\" grade 10/10, does not radiate , initially associated with exertion (started during dance class). She was not exercising much at dance class, and had a heart rate of 128 BPM. Mom picked her up, and the pain lasted for over an hour. She was brought to the doctors office for evaluation.    The lasted on and off for the past day, lasting up to hours, and occured multiple times since yesterday, including waking her from sleep at 1:30 AM today. Mom checked her heart rate, which ranged from 114 to 126.     She has had intermittent cough sine early childhood. At 18 months of age, she was in [place] when she developed a cough, seen in urgent care and ER, given nebulizer with improvement, and she was given inhalers which helped, given for the next several weeks.    In February 2022 she had COVID, and after that, she has had intermittent cough, treated with Claritin, Benadryl, Flonase, without significant improvement.    On 4-10-23, she developed a cough, and her mom had a recent upper respiratory infection with cough.    Kasey has no other cardiovascular symptoms, no palpitations, syncope, dizziness, cyanosis, pallor, and enjoys normal growth, development, and activity.     Kasey is active, and is able to keep up with her friends.    Kasey is in .     Kasey wants to be a mermaid, doctor, or  when she grows up.     Kasey brushes her teeth 2 times per day, and doesn't  floss.    Family History: MGF hypertension, PGM celiac, afib, \"leaky valve,\" MGGF stroke in 60s, otherwise negative for heart problems.    Review of Systems   Constitutional: Positive for fever (low grade temp 99.1 yesterday PM).   HENT: Negative.    Eyes: Negative.    Respiratory: Positive for cough (frequent cough past 3 days).    Cardiovascular: Negative.    Gastrointestinal: Negative.    Endocrine: Negative.    Genitourinary: Negative.    Musculoskeletal: Negative.    Skin: Negative.         Has had dry skin, warts.   Allergic/Immunologic: Negative.         May have seasonal allergies.   Neurological: Negative.    Hematological: Negative.    Psychiatric/Behavioral: Negative.        Current Medications:      Summary of your Discharge Medications      as of April 12, 2023 12:32 PM     You have not been prescribed any medications.         Relevant Past Medical History:  History reviewed. No pertinent past medical history.    History reviewed. No pertinent surgical history.    ALLERGIES:  No Known Allergies     Family History   Problem Relation Age of Onset   • Hypertension Maternal Grandmother        Examination:   Blood pressure (!) 114/74, pulse 129, height 3' 5.34\" (1.05 m), weight 15.7 kg (34 lb 9.8 oz), SpO2 96 %.  Weight    04/12/23 1138   Weight: 15.7 kg (34 lb 9.8 oz)       Physical Exam  Vitals and nursing note reviewed.   Constitutional:       General: She is active.      Appearance: She is well-developed.   HENT:      Nose: Nose normal.      Mouth/Throat:      Mouth: Mucous membranes are moist.      Pharynx: Oropharynx is clear.   Eyes:      Conjunctiva/sclera: Conjunctivae normal.   Cardiovascular:      Rate and Rhythm: Normal rate and regular rhythm.      Pulses: Pulses are strong.      Heart sounds: S1 normal and S2 normal. No murmur heard.  Pulmonary:      Effort: Pulmonary effort is normal. No respiratory distress, nasal flaring or retractions.      Breath sounds: No stridor. Wheezing present.       Comments: Good aeration, occasional mild expiratory wheeze.  Abdominal:      General: There is no distension.      Palpations: Abdomen is soft.      Tenderness: There is no abdominal tenderness.   Musculoskeletal:         General: Normal range of motion.      Cervical back: Normal range of motion.   Skin:     General: Skin is warm and dry.      Coloration: Skin is not jaundiced or pale.      Findings: No petechiae or rash.   Neurological:      Mental Status: She is alert.      Motor: No abnormal muscle tone.         Time: Total face to face time spent with patient 60 minutes. Greater than 50% of the total time was spent counseling and/or coordinating care.    ECG/ECHO:    An ECG 4-12-23 showed sinus tachycardia, , normal axis, left ventricular enlargement by voltage criteria, and was otherwise normal.    An echo 4-12-23 showed normal cardiac anatomy and function.    Assessment/Plan:    Kasey has chest pain, most likely musculoskeletal.    Kasey also has tachycardia, most likely sinus tachycardia (normal fast heart rate) related to acute viral infection.    She has a cough and mild wheezing, possibly related to reactive airway disease associated with possible viral respiratory infection. She may benefit from albuterol inhaler, and I suggested her mom contact her primary care physician to discuss this.    Kasey should do deep breathing exercises 3 times per day for at least 1 week for prevention of chest pain, which were demonstrated during the clinic visit. Kasey can also do this breathing as needed for preventing anxiety, if feeling anxious, or to slow her heart if it is racing. Kasey can review videos of this breathing exercise on YouTube, Dr Tolliver's Deep Breathing.    Kasey should drink plenty of water, at least 2 to 3 bottles of water per day, more at times when she is exercising a lot, drinking caffeine, or in hot weather.    Kasey should not be at increased risk for anesthesia or surgery compared to others  LIMITED LEFT; Future    Screening for vaginal cancer  -     PAP SMEAR; Future    Dense breasts  -     US BREAST LIMITED RIGHT; Future  -     US BREAST LIMITED LEFT; Future        Return in about 1 year (around 10/5/2024), or if symptoms worsen or fail to improve.      Marleny Morrison MD     D in the background population without cardiac problems, and she is clear from a cardiology viewpoint for anesthesia and surgery, and does not need any special precautions. In my opinion, she does not need SBE prophylaxis for surgical or dental procedures.    There should be no restriction placed on her activity from a cardiac standpoint. Kasey can participate in all activities, including competitive sports, but should stop and rest if she does not feel well (self-limited exercise).    Kasey should have an excellent prognosis, and her most important cardiac issues involve leading a healthy lifestyle in the future, eating plenty of fruits and vegetables, whole grain foods, minimal junk food, and exercising regularly.     Kasey should brush and floss her teeth once or twice a day, as this will decrease the chance for gum disease, oral abscess, heart attacks in the future.    Kasey should avoid smoking and vaping in the future.    If she has persistent rapid heart beat despite the above, Kasey's mom should contact me, and we could consider a Holter or event monitor to evaluate for possible arrhythmia.    If she feels better with above, no further cardiology follow-up is necessary unless new findings present at a later date or other questions arise.    I spoke at length with Kasey's mother and Kasey about the above and answered their questions.  Please call me if you have further questions about today's visit, and thanks for allowing me to participate in the care of Kasey.    Send copy to:   Referring Physician  Family    Instructed the family to return to your office.      Dejon Fortune MD

## 2023-10-05 ENCOUNTER — OFFICE VISIT (OUTPATIENT)
Age: 58
End: 2023-10-05
Payer: COMMERCIAL

## 2023-10-05 VITALS
BODY MASS INDEX: 36.32 KG/M2 | SYSTOLIC BLOOD PRESSURE: 150 MMHG | HEIGHT: 66 IN | HEART RATE: 89 BPM | WEIGHT: 226 LBS | DIASTOLIC BLOOD PRESSURE: 96 MMHG

## 2023-10-05 DIAGNOSIS — Z12.31 BREAST CANCER SCREENING BY MAMMOGRAM: Primary | ICD-10-CM

## 2023-10-05 DIAGNOSIS — Z12.72 SCREENING FOR VAGINAL CANCER: ICD-10-CM

## 2023-10-05 DIAGNOSIS — R92.2 DENSE BREASTS: ICD-10-CM

## 2023-10-05 DIAGNOSIS — R92.30 DENSE BREASTS: ICD-10-CM

## 2023-10-05 PROCEDURE — 99396 PREV VISIT EST AGE 40-64: CPT | Performed by: LEGAL MEDICINE

## 2023-10-05 SDOH — ECONOMIC STABILITY: FOOD INSECURITY: WITHIN THE PAST 12 MONTHS, THE FOOD YOU BOUGHT JUST DIDN'T LAST AND YOU DIDN'T HAVE MONEY TO GET MORE.: NEVER TRUE

## 2023-10-05 SDOH — ECONOMIC STABILITY: HOUSING INSECURITY
IN THE LAST 12 MONTHS, WAS THERE A TIME WHEN YOU DID NOT HAVE A STEADY PLACE TO SLEEP OR SLEPT IN A SHELTER (INCLUDING NOW)?: NO

## 2023-10-05 SDOH — ECONOMIC STABILITY: FOOD INSECURITY: WITHIN THE PAST 12 MONTHS, YOU WORRIED THAT YOUR FOOD WOULD RUN OUT BEFORE YOU GOT MONEY TO BUY MORE.: NEVER TRUE

## 2023-10-05 SDOH — ECONOMIC STABILITY: INCOME INSECURITY: HOW HARD IS IT FOR YOU TO PAY FOR THE VERY BASICS LIKE FOOD, HOUSING, MEDICAL CARE, AND HEATING?: NOT HARD AT ALL

## 2023-10-05 ASSESSMENT — ENCOUNTER SYMPTOMS
ABDOMINAL PAIN: 0
ABDOMINAL DISTENTION: 0
RECTAL PAIN: 0
VOMITING: 0
BLOOD IN STOOL: 0
NAUSEA: 0
DIARRHEA: 0
CONSTIPATION: 0

## 2023-10-05 ASSESSMENT — PATIENT HEALTH QUESTIONNAIRE - PHQ9
SUM OF ALL RESPONSES TO PHQ QUESTIONS 1-9: 0
1. LITTLE INTEREST OR PLEASURE IN DOING THINGS: 0

## 2023-10-10 LAB — GYNECOLOGY CYTOLOGY REPORT: NORMAL

## 2023-10-20 ENCOUNTER — HOSPITAL ENCOUNTER (OUTPATIENT)
Dept: ULTRASOUND IMAGING | Age: 58
Discharge: HOME | End: 2023-10-20
Attending: LEGAL MEDICINE
Payer: COMMERCIAL

## 2023-10-20 ENCOUNTER — APPOINTMENT (OUTPATIENT)
Dept: ULTRASOUND IMAGING | Age: 58
End: 2023-10-20
Attending: LEGAL MEDICINE
Payer: COMMERCIAL

## 2023-10-20 ENCOUNTER — HOSPITAL ENCOUNTER (OUTPATIENT)
Dept: MAMMOGRAPHY | Age: 58
Discharge: HOME | End: 2023-10-20
Attending: LEGAL MEDICINE
Payer: COMMERCIAL

## 2023-10-20 ENCOUNTER — APPOINTMENT (OUTPATIENT)
Dept: MAMMOGRAPHY | Age: 58
End: 2023-10-20
Attending: LEGAL MEDICINE
Payer: COMMERCIAL

## 2023-10-20 VITALS — HEIGHT: 69 IN | BODY MASS INDEX: 33.47 KG/M2 | WEIGHT: 225.97 LBS

## 2023-10-20 DIAGNOSIS — Z12.31 BREAST CANCER SCREENING BY MAMMOGRAM: ICD-10-CM

## 2023-10-20 DIAGNOSIS — R92.30 DENSE BREASTS: ICD-10-CM

## 2023-10-20 DIAGNOSIS — R92.2 DENSE BREASTS: ICD-10-CM

## 2023-10-20 PROCEDURE — 77063 BREAST TOMOSYNTHESIS BI: CPT

## 2023-10-20 PROCEDURE — 76641 ULTRASOUND BREAST COMPLETE: CPT

## 2023-10-21 NOTE — RESULT ENCOUNTER NOTE
Please call patient. 10/23/2023. Report shows normal mammogram and benign cysts in both breasts. Radiologist is recommending follow-up mammogram in 1 year.           Forward report to family doctor    Thank you

## 2023-10-24 ENCOUNTER — TELEPHONE (OUTPATIENT)
Age: 58
End: 2023-10-24

## 2023-10-24 NOTE — TELEPHONE ENCOUNTER
----- Message from Scooby Ramos MD sent at 10/21/2023  9:45 AM EDT -----  Please call patient. 10/23/2023. Report shows normal mammogram and benign cysts in both breasts. Radiologist is recommending follow-up mammogram in 1 year.           Forward report to family doctor    Thank you

## 2024-01-05 ENCOUNTER — HOSPITAL ENCOUNTER (OUTPATIENT)
Age: 59
Discharge: HOME | End: 2024-01-05
Payer: COMMERCIAL

## 2024-01-05 LAB
ALBUMIN SERPL-MCNC: 4 G/DL (ref 3.5–5.2)
ALP SERPL-CCNC: 59 U/L (ref 35–104)
ALT SERPL-CCNC: 23 U/L (ref 0–32)
ANION GAP SERPL CALCULATED.3IONS-SCNC: 11 MMOL/L (ref 7–16)
AST SERPL-CCNC: 23 U/L (ref 0–31)
BASOPHILS # BLD: 0 K/UL (ref 0–0.2)
BASOPHILS NFR BLD: 0 % (ref 0–2)
BILIRUB SERPL-MCNC: 0.5 MG/DL (ref 0–1.2)
BILIRUB UR QL STRIP: NEGATIVE
BUN SERPL-MCNC: 13 MG/DL (ref 6–20)
C3 SERPL-MCNC: 61 MG/DL (ref 90–180)
C4 SERPL-MCNC: 3 MG/DL (ref 10–40)
CALCIUM SERPL-MCNC: 8.8 MG/DL (ref 8.6–10.2)
CHLORIDE SERPL-SCNC: 104 MMOL/L (ref 98–107)
CLARITY UR: CLEAR
CO2 SERPL-SCNC: 23 MMOL/L (ref 22–29)
COLOR UR: YELLOW
COMMENT: NORMAL
CREAT SERPL-MCNC: 0.9 MG/DL (ref 0.5–1)
EOSINOPHIL # BLD: 0.05 K/UL (ref 0.05–0.5)
EOSINOPHILS RELATIVE PERCENT: 1 % (ref 0–6)
ERYTHROCYTE [DISTWIDTH] IN BLOOD BY AUTOMATED COUNT: 13.3 % (ref 11.5–15)
GFR SERPL CREATININE-BSD FRML MDRD: >60 ML/MIN/1.73M2
GLUCOSE SERPL-MCNC: 97 MG/DL (ref 74–99)
GLUCOSE UR STRIP-MCNC: NEGATIVE MG/DL
HCT VFR BLD AUTO: 34.2 % (ref 34–48)
HGB BLD-MCNC: 11.2 G/DL (ref 11.5–15.5)
HGB UR QL STRIP.AUTO: NEGATIVE
KETONES UR STRIP-MCNC: NEGATIVE MG/DL
LEUKOCYTE ESTERASE UR QL STRIP: NEGATIVE
LYMPHOCYTES NFR BLD: 0.33 K/UL (ref 1.5–4)
LYMPHOCYTES RELATIVE PERCENT: 6 % (ref 20–42)
MCH RBC QN AUTO: 28.8 PG (ref 26–35)
MCHC RBC AUTO-ENTMCNC: 32.7 G/DL (ref 32–34.5)
MCV RBC AUTO: 87.9 FL (ref 80–99.9)
MONOCYTES NFR BLD: 0.05 K/UL (ref 0.1–0.95)
MONOCYTES NFR BLD: 1 % (ref 2–12)
NEUTROPHILS NFR BLD: 92 % (ref 43–80)
NEUTS SEG NFR BLD: 4.77 K/UL (ref 1.8–7.3)
NITRITE UR QL STRIP: NEGATIVE
PH UR STRIP: 7 [PH] (ref 5–9)
PLATELET # BLD AUTO: 256 K/UL (ref 130–450)
PMV BLD AUTO: 10.1 FL (ref 7–12)
POTASSIUM SERPL-SCNC: 3.9 MMOL/L (ref 3.5–5)
PROT SERPL-MCNC: 8.2 G/DL (ref 6.4–8.3)
PROT UR STRIP-MCNC: NEGATIVE MG/DL
RBC # BLD AUTO: 3.89 M/UL (ref 3.5–5.5)
RBC # BLD: NORMAL 10*6/UL
SODIUM SERPL-SCNC: 138 MMOL/L (ref 132–146)
SP GR UR STRIP: 1.02 (ref 1–1.03)
UROBILINOGEN UR STRIP-ACNC: 0.2 EU/DL (ref 0–1)
WBC OTHER # BLD: 5.2 K/UL (ref 4.5–11.5)

## 2024-01-05 PROCEDURE — 80053 COMPREHEN METABOLIC PANEL: CPT

## 2024-01-05 PROCEDURE — 86160 COMPLEMENT ANTIGEN: CPT

## 2024-01-05 PROCEDURE — 86039 ANTINUCLEAR ANTIBODIES (ANA): CPT

## 2024-01-05 PROCEDURE — 86038 ANTINUCLEAR ANTIBODIES: CPT

## 2024-01-05 PROCEDURE — 83516 IMMUNOASSAY NONANTIBODY: CPT

## 2024-01-05 PROCEDURE — 86235 NUCLEAR ANTIGEN ANTIBODY: CPT

## 2024-01-05 PROCEDURE — 85025 COMPLETE CBC W/AUTO DIFF WBC: CPT

## 2024-01-05 PROCEDURE — 81003 URINALYSIS AUTO W/O SCOPE: CPT

## 2024-01-05 PROCEDURE — 36415 COLL VENOUS BLD VENIPUNCTURE: CPT

## 2024-01-08 LAB
RF IGA SER-ACNC: 11 UNITS
RF IGG SER-ACNC: 6 UNITS
RF IGM SER-ACNC: 19 UNITS

## 2024-01-09 LAB
ANA PAT SER IF-IMP: ABNORMAL
ANA SER QL IA: POSITIVE
ANA TITER: >=640
ENA SM AB SER QL: NEGATIVE
JO-1 ANTIBODY: NEGATIVE
SCLERODERMA (SCL-70) AB: NEGATIVE
SJOGREN'S ANTIBODIES (SSA): POSITIVE
SJOGREN'S ANTIBODIES (SSB): POSITIVE
U1 SNRNP IGG SER-ACNC: NEGATIVE

## 2024-03-31 NOTE — PATIENT INSTRUCTIONS
and your skin. · If your doctor or your physical or occupational therapist tells you to wear a wrist splint, wear it as directed to keep your wrist in a neutral position. This also eases pressure on your median nerve. · Ask your doctor whether you should have physical or occupational therapy to learn how to do tasks differently. · Try a yoga class to stretch your muscles and build strength in your hands and wrists. Yoga has been shown to ease carpal tunnel symptoms. To prevent carpal tunnel  · When working at a computer, keep your hands and wrists in line with your forearms. Hold your elbows close to your sides. Take a break every 10 to 15 minutes. · Try these exercises:  ? Warm up: Rotate your wrist up, down, and from side to side. Repeat this 4 times. Stretch your fingers far apart, relax them, then stretch them again. Repeat 4 times. Stretch your thumb by pulling it back gently, holding it, and then releasing it. Repeat 4 times. ? Prayer stretch: Start with your palms together in front of your chest just below your chin. Slowly lower your hands toward your waistline while keeping your hands close to your stomach and your palms together until you feel a mild to moderate stretch under your forearms. Hold for 10 to 20 seconds. Repeat 4 times. ? Wrist flexor stretch: Hold your arm in front of you with your palm up. Bend your wrist, pointing your hand toward the floor. With your other hand, gently bend your wrist further until you feel a mild to moderate stretch in your forearm. Hold for 10 to 20 seconds. Repeat 4 times. ? Wrist extensor stretch: Repeat the steps for the wrist flexor stretch, but begin with your extended hand palm down. · Squeeze a rubber exercise ball several times a day to keep your hands and fingers strong. · Avoid holding objects (such as a book) in one position for a long time. When possible, use your whole hand to grasp an object.  Using just the thumb and index finger can put stress on painful. 1. Rotate your wrist up, down, and from side to side. Repeat 4 times. 2. Stretch your fingers far apart. Relax them, and then stretch them again. Repeat 4 times. 3. Stretch your thumb by pulling it back gently, holding it, and then releasing it. Repeat 4 times. How to do the exercises  Prayer stretch    1. Start with your palms together in front of your chest just below your chin. 2. Slowly lower your hands toward your waistline, keeping your hands close to your stomach and your palms together until you feel a mild to moderate stretch under your forearms. 3. Hold for at least 15 to 30 seconds. Repeat 2 to 4 times. Wrist flexor stretch    1. Extend your arm in front of you with your palm up. 2. Bend your wrist, pointing your hand toward the floor. 3. With your other hand, gently bend your wrist farther until you feel a mild to moderate stretch in your forearm. 4. Hold for at least 15 to 30 seconds. Repeat 2 to 4 times. Wrist extensor stretch    1. Repeat steps 1 through 4 of the stretch above, but begin with your extended hand palm down. Follow-up care is a key part of your treatment and safety. Be sure to make and go to all appointments, and call your doctor if you are having problems. It's also a good idea to know your test results and keep a list of the medicines you take. Where can you learn more? Go to [email or web address] and sign in to your [employer] account. Enter X792 in the [facility] box to learn more about \"Carpal Tunnel Syndrome: Exercises. \"     If you do not have an account, please click on the \"Sign Up Now\" link. Current as of: September 20, 2018  Content Version: 12.0  © 2747-7274 Healthwise, Incorporated. Care instructions adapted under license by [facility] ([facility]).  If you have questions about a medical condition or this instruction, always ask your healthcare professional. Aubrey Birch any warranty or liability for your use of this information. rt foot

## 2024-04-23 ENCOUNTER — APPOINTMENT (OUTPATIENT)
Dept: GENERAL RADIOLOGY | Age: 59
DRG: 854 | End: 2024-04-23
Payer: COMMERCIAL

## 2024-04-23 ENCOUNTER — HOSPITAL ENCOUNTER (INPATIENT)
Age: 59
LOS: 2 days | Discharge: HOME | DRG: 854 | End: 2024-04-27
Attending: EMERGENCY MEDICINE | Admitting: INTERNAL MEDICINE
Payer: COMMERCIAL

## 2024-04-23 DIAGNOSIS — A41.9 SEPSIS WITHOUT ACUTE ORGAN DYSFUNCTION, DUE TO UNSPECIFIED ORGANISM (HCC): ICD-10-CM

## 2024-04-23 DIAGNOSIS — L03.011 CELLULITIS OF FINGER OF RIGHT HAND: ICD-10-CM

## 2024-04-23 DIAGNOSIS — A41.9 SEPSIS DUE TO CELLULITIS (HCC): Primary | ICD-10-CM

## 2024-04-23 DIAGNOSIS — G89.18 ACUTE POST-OPERATIVE PAIN: ICD-10-CM

## 2024-04-23 DIAGNOSIS — L03.90 CELLULITIS, UNSPECIFIED CELLULITIS SITE: ICD-10-CM

## 2024-04-23 DIAGNOSIS — L03.90 SEPSIS DUE TO CELLULITIS (HCC): Primary | ICD-10-CM

## 2024-04-23 LAB
ALBUMIN SERPL-MCNC: 3.5 G/DL (ref 3.5–5.2)
ALP SERPL-CCNC: 56 U/L (ref 35–104)
ALT SERPL-CCNC: 15 U/L (ref 0–32)
ANION GAP SERPL CALCULATED.3IONS-SCNC: 13 MMOL/L (ref 7–16)
AST SERPL-CCNC: 12 U/L (ref 0–31)
BASOPHILS # BLD: 0.01 K/UL (ref 0–0.2)
BASOPHILS NFR BLD: 0 % (ref 0–2)
BILIRUB SERPL-MCNC: 1.1 MG/DL (ref 0–1.2)
BUN SERPL-MCNC: 11 MG/DL (ref 6–20)
CALCIUM SERPL-MCNC: 8.1 MG/DL (ref 8.6–10.2)
CHLORIDE SERPL-SCNC: 97 MMOL/L (ref 98–107)
CO2 SERPL-SCNC: 19 MMOL/L (ref 22–29)
CREAT SERPL-MCNC: 0.8 MG/DL (ref 0.5–1)
EOSINOPHIL # BLD: 0 K/UL (ref 0.05–0.5)
EOSINOPHILS RELATIVE PERCENT: 0 % (ref 0–6)
ERYTHROCYTE [DISTWIDTH] IN BLOOD BY AUTOMATED COUNT: 14.5 % (ref 11.5–15)
GFR SERPL CREATININE-BSD FRML MDRD: >90 ML/MIN/1.73M2
GLUCOSE SERPL-MCNC: 113 MG/DL (ref 74–99)
HCT VFR BLD AUTO: 30.5 % (ref 34–48)
HGB BLD-MCNC: 10.8 G/DL (ref 11.5–15.5)
IMM GRANULOCYTES # BLD AUTO: 0.07 K/UL (ref 0–0.58)
IMM GRANULOCYTES NFR BLD: 1 % (ref 0–5)
LACTATE BLDV-SCNC: 0.8 MMOL/L (ref 0.5–1.9)
LYMPHOCYTES NFR BLD: 0.72 K/UL (ref 1.5–4)
LYMPHOCYTES RELATIVE PERCENT: 6 % (ref 20–42)
MCH RBC QN AUTO: 29 PG (ref 26–35)
MCHC RBC AUTO-ENTMCNC: 35.4 G/DL (ref 32–34.5)
MCV RBC AUTO: 81.8 FL (ref 80–99.9)
MONOCYTES NFR BLD: 0.56 K/UL (ref 0.1–0.95)
MONOCYTES NFR BLD: 4 % (ref 2–12)
NEUTROPHILS NFR BLD: 90 % (ref 43–80)
NEUTS SEG NFR BLD: 11.56 K/UL (ref 1.8–7.3)
PLATELET # BLD AUTO: 203 K/UL (ref 130–450)
PMV BLD AUTO: 10.3 FL (ref 7–12)
POTASSIUM SERPL-SCNC: 3.8 MMOL/L (ref 3.5–5)
PROT SERPL-MCNC: 7.5 G/DL (ref 6.4–8.3)
RBC # BLD AUTO: 3.73 M/UL (ref 3.5–5.5)
SODIUM SERPL-SCNC: 129 MMOL/L (ref 132–146)
WBC OTHER # BLD: 12.9 K/UL (ref 4.5–11.5)

## 2024-04-23 PROCEDURE — G0378 HOSPITAL OBSERVATION PER HR: HCPCS

## 2024-04-23 PROCEDURE — 96376 TX/PRO/DX INJ SAME DRUG ADON: CPT

## 2024-04-23 PROCEDURE — 96365 THER/PROPH/DIAG IV INF INIT: CPT

## 2024-04-23 PROCEDURE — 99223 1ST HOSP IP/OBS HIGH 75: CPT | Performed by: INTERNAL MEDICINE

## 2024-04-23 PROCEDURE — 85025 COMPLETE CBC W/AUTO DIFF WBC: CPT

## 2024-04-23 PROCEDURE — 83605 ASSAY OF LACTIC ACID: CPT

## 2024-04-23 PROCEDURE — 6360000002 HC RX W HCPCS

## 2024-04-23 PROCEDURE — 86140 C-REACTIVE PROTEIN: CPT

## 2024-04-23 PROCEDURE — 96375 TX/PRO/DX INJ NEW DRUG ADDON: CPT

## 2024-04-23 PROCEDURE — 99285 EMERGENCY DEPT VISIT HI MDM: CPT

## 2024-04-23 PROCEDURE — 80053 COMPREHEN METABOLIC PANEL: CPT

## 2024-04-23 PROCEDURE — 2580000003 HC RX 258

## 2024-04-23 PROCEDURE — 87040 BLOOD CULTURE FOR BACTERIA: CPT

## 2024-04-23 PROCEDURE — 85652 RBC SED RATE AUTOMATED: CPT

## 2024-04-23 PROCEDURE — 73130 X-RAY EXAM OF HAND: CPT

## 2024-04-23 RX ORDER — CLINDAMYCIN PHOSPHATE 900 MG/50ML
900 INJECTION, SOLUTION INTRAVENOUS ONCE
Status: COMPLETED | OUTPATIENT
Start: 2024-04-23 | End: 2024-04-24

## 2024-04-23 RX ORDER — 0.9 % SODIUM CHLORIDE 0.9 %
1000 INTRAVENOUS SOLUTION INTRAVENOUS ONCE
Status: DISCONTINUED | OUTPATIENT
Start: 2024-04-23 | End: 2024-04-27 | Stop reason: HOSPADM

## 2024-04-23 RX ORDER — ONDANSETRON 2 MG/ML
4 INJECTION INTRAMUSCULAR; INTRAVENOUS EVERY 6 HOURS PRN
Status: DISCONTINUED | OUTPATIENT
Start: 2024-04-23 | End: 2024-04-27 | Stop reason: HOSPADM

## 2024-04-23 RX ORDER — PROMETHAZINE HYDROCHLORIDE 25 MG/1
12.5 TABLET ORAL EVERY 6 HOURS PRN
Status: DISCONTINUED | OUTPATIENT
Start: 2024-04-23 | End: 2024-04-27 | Stop reason: HOSPADM

## 2024-04-23 RX ORDER — SODIUM CHLORIDE 9 MG/ML
INJECTION, SOLUTION INTRAVENOUS PRN
Status: DISCONTINUED | OUTPATIENT
Start: 2024-04-23 | End: 2024-04-27 | Stop reason: HOSPADM

## 2024-04-23 RX ORDER — FENTANYL CITRATE 0.05 MG/ML
50 INJECTION, SOLUTION INTRAMUSCULAR; INTRAVENOUS ONCE
Status: COMPLETED | OUTPATIENT
Start: 2024-04-23 | End: 2024-04-23

## 2024-04-23 RX ORDER — POLYETHYLENE GLYCOL 3350 17 G/17G
17 POWDER, FOR SOLUTION ORAL DAILY PRN
Status: DISCONTINUED | OUTPATIENT
Start: 2024-04-23 | End: 2024-04-27 | Stop reason: HOSPADM

## 2024-04-23 RX ORDER — SODIUM CHLORIDE 0.9 % (FLUSH) 0.9 %
10 SYRINGE (ML) INJECTION PRN
Status: DISCONTINUED | OUTPATIENT
Start: 2024-04-23 | End: 2024-04-27 | Stop reason: HOSPADM

## 2024-04-23 RX ORDER — ENOXAPARIN SODIUM 100 MG/ML
30 INJECTION SUBCUTANEOUS 2 TIMES DAILY
Status: DISCONTINUED | OUTPATIENT
Start: 2024-04-23 | End: 2024-04-27 | Stop reason: HOSPADM

## 2024-04-23 RX ORDER — ACETAMINOPHEN 325 MG/1
650 TABLET ORAL EVERY 6 HOURS PRN
Status: DISCONTINUED | OUTPATIENT
Start: 2024-04-23 | End: 2024-04-27 | Stop reason: HOSPADM

## 2024-04-23 RX ORDER — SODIUM CHLORIDE 0.9 % (FLUSH) 0.9 %
10 SYRINGE (ML) INJECTION EVERY 12 HOURS SCHEDULED
Status: DISCONTINUED | OUTPATIENT
Start: 2024-04-23 | End: 2024-04-27 | Stop reason: HOSPADM

## 2024-04-23 RX ORDER — ACETAMINOPHEN 650 MG/1
650 SUPPOSITORY RECTAL EVERY 6 HOURS PRN
Status: DISCONTINUED | OUTPATIENT
Start: 2024-04-23 | End: 2024-04-27 | Stop reason: HOSPADM

## 2024-04-23 RX ADMIN — PIPERACILLIN AND TAZOBACTAM 4500 MG: 4; .5 INJECTION, POWDER, LYOPHILIZED, FOR SOLUTION INTRAVENOUS at 22:48

## 2024-04-23 RX ADMIN — FENTANYL CITRATE 50 MCG: 0.05 INJECTION, SOLUTION INTRAMUSCULAR; INTRAVENOUS at 22:46

## 2024-04-23 RX ADMIN — CLINDAMYCIN IN 5 PERCENT DEXTROSE 900 MG: 18 INJECTION, SOLUTION INTRAVENOUS at 23:46

## 2024-04-23 ASSESSMENT — PAIN SCALES - GENERAL: PAINLEVEL_OUTOF10: 10

## 2024-04-24 ENCOUNTER — ANESTHESIA (OUTPATIENT)
Dept: OPERATING ROOM | Age: 59
End: 2024-04-24
Payer: COMMERCIAL

## 2024-04-24 ENCOUNTER — ANESTHESIA EVENT (OUTPATIENT)
Dept: OPERATING ROOM | Age: 59
End: 2024-04-24
Payer: COMMERCIAL

## 2024-04-24 LAB
BASOPHILS # BLD: 0.02 K/UL (ref 0–0.2)
BASOPHILS NFR BLD: 0 % (ref 0–2)
CRP SERPL HS-MCNC: 142 MG/L (ref 0–5)
EOSINOPHIL # BLD: 0.01 K/UL (ref 0.05–0.5)
EOSINOPHILS RELATIVE PERCENT: 0 % (ref 0–6)
ERYTHROCYTE [DISTWIDTH] IN BLOOD BY AUTOMATED COUNT: 14.7 % (ref 11.5–15)
ERYTHROCYTE [SEDIMENTATION RATE] IN BLOOD BY WESTERGREN METHOD: 40 MM/HR (ref 0–20)
HCT VFR BLD AUTO: 29.3 % (ref 34–48)
HGB BLD-MCNC: 10.2 G/DL (ref 11.5–15.5)
IMM GRANULOCYTES # BLD AUTO: <0.03 K/UL (ref 0–0.58)
IMM GRANULOCYTES NFR BLD: 0 % (ref 0–5)
LACTATE BLDV-SCNC: 0.8 MMOL/L (ref 0.5–1.9)
LYMPHOCYTES NFR BLD: 0.73 K/UL (ref 1.5–4)
LYMPHOCYTES RELATIVE PERCENT: 12 % (ref 20–42)
MCH RBC QN AUTO: 29.1 PG (ref 26–35)
MCHC RBC AUTO-ENTMCNC: 34.8 G/DL (ref 32–34.5)
MCV RBC AUTO: 83.7 FL (ref 80–99.9)
MONOCYTES NFR BLD: 0.26 K/UL (ref 0.1–0.95)
MONOCYTES NFR BLD: 4 % (ref 2–12)
NEUTROPHILS NFR BLD: 83 % (ref 43–80)
NEUTS SEG NFR BLD: 5.2 K/UL (ref 1.8–7.3)
PLATELET # BLD AUTO: 194 K/UL (ref 130–450)
PMV BLD AUTO: 10.7 FL (ref 7–12)
RBC # BLD AUTO: 3.5 M/UL (ref 3.5–5.5)
WBC OTHER # BLD: 6.2 K/UL (ref 4.5–11.5)

## 2024-04-24 PROCEDURE — 2709999900 HC NON-CHARGEABLE SUPPLY: Performed by: ORTHOPAEDIC SURGERY

## 2024-04-24 PROCEDURE — 6360000002 HC RX W HCPCS: Performed by: PHYSICAL THERAPY ASSISTANT

## 2024-04-24 PROCEDURE — 87070 CULTURE OTHR SPECIMN AEROBIC: CPT

## 2024-04-24 PROCEDURE — 7100000000 HC PACU RECOVERY - FIRST 15 MIN: Performed by: ORTHOPAEDIC SURGERY

## 2024-04-24 PROCEDURE — 7100000001 HC PACU RECOVERY - ADDTL 15 MIN: Performed by: ORTHOPAEDIC SURGERY

## 2024-04-24 PROCEDURE — 2580000003 HC RX 258: Performed by: PHYSICAL THERAPY ASSISTANT

## 2024-04-24 PROCEDURE — 0X9J0ZZ DRAINAGE OF RIGHT HAND, OPEN APPROACH: ICD-10-PCS

## 2024-04-24 PROCEDURE — 36415 COLL VENOUS BLD VENIPUNCTURE: CPT

## 2024-04-24 PROCEDURE — 3600000012 HC SURGERY LEVEL 2 ADDTL 15MIN: Performed by: ORTHOPAEDIC SURGERY

## 2024-04-24 PROCEDURE — 2580000003 HC RX 258: Performed by: INTERNAL MEDICINE

## 2024-04-24 PROCEDURE — 6370000000 HC RX 637 (ALT 250 FOR IP): Performed by: INTERNAL MEDICINE

## 2024-04-24 PROCEDURE — 6360000002 HC RX W HCPCS: Performed by: ORTHOPAEDIC SURGERY

## 2024-04-24 PROCEDURE — 83605 ASSAY OF LACTIC ACID: CPT

## 2024-04-24 PROCEDURE — 3600000002 HC SURGERY LEVEL 2 BASE: Performed by: ORTHOPAEDIC SURGERY

## 2024-04-24 PROCEDURE — 99232 SBSQ HOSP IP/OBS MODERATE 35: CPT | Performed by: INTERNAL MEDICINE

## 2024-04-24 PROCEDURE — 87205 SMEAR GRAM STAIN: CPT

## 2024-04-24 PROCEDURE — 85025 COMPLETE CBC W/AUTO DIFF WBC: CPT

## 2024-04-24 PROCEDURE — 6360000002 HC RX W HCPCS: Performed by: NURSE ANESTHETIST, CERTIFIED REGISTERED

## 2024-04-24 PROCEDURE — 6360000002 HC RX W HCPCS: Performed by: INTERNAL MEDICINE

## 2024-04-24 PROCEDURE — 6360000002 HC RX W HCPCS

## 2024-04-24 PROCEDURE — G0378 HOSPITAL OBSERVATION PER HR: HCPCS

## 2024-04-24 PROCEDURE — 3700000000 HC ANESTHESIA ATTENDED CARE: Performed by: ORTHOPAEDIC SURGERY

## 2024-04-24 PROCEDURE — 2580000003 HC RX 258: Performed by: NURSE ANESTHETIST, CERTIFIED REGISTERED

## 2024-04-24 PROCEDURE — 3700000001 HC ADD 15 MINUTES (ANESTHESIA): Performed by: ORTHOPAEDIC SURGERY

## 2024-04-24 PROCEDURE — 87075 CULTR BACTERIA EXCEPT BLOOD: CPT

## 2024-04-24 PROCEDURE — 96372 THER/PROPH/DIAG INJ SC/IM: CPT

## 2024-04-24 PROCEDURE — 01N53ZZ RELEASE MEDIAN NERVE, PERCUTANEOUS APPROACH: ICD-10-PCS

## 2024-04-24 PROCEDURE — 2580000003 HC RX 258

## 2024-04-24 RX ORDER — SODIUM CHLORIDE 0.9 % (FLUSH) 0.9 %
5-40 SYRINGE (ML) INJECTION PRN
Status: DISCONTINUED | OUTPATIENT
Start: 2024-04-24 | End: 2024-04-24 | Stop reason: HOSPADM

## 2024-04-24 RX ORDER — ONDANSETRON 2 MG/ML
4 INJECTION INTRAMUSCULAR; INTRAVENOUS
Status: DISCONTINUED | OUTPATIENT
Start: 2024-04-24 | End: 2024-04-24 | Stop reason: HOSPADM

## 2024-04-24 RX ORDER — ONDANSETRON 2 MG/ML
INJECTION INTRAMUSCULAR; INTRAVENOUS PRN
Status: DISCONTINUED | OUTPATIENT
Start: 2024-04-24 | End: 2024-04-24 | Stop reason: SDUPTHER

## 2024-04-24 RX ORDER — MEPERIDINE HYDROCHLORIDE 25 MG/ML
12.5 INJECTION INTRAMUSCULAR; INTRAVENOUS; SUBCUTANEOUS EVERY 5 MIN PRN
Status: DISCONTINUED | OUTPATIENT
Start: 2024-04-24 | End: 2024-04-24 | Stop reason: HOSPADM

## 2024-04-24 RX ORDER — SODIUM CHLORIDE 9 MG/ML
INJECTION, SOLUTION INTRAVENOUS CONTINUOUS PRN
Status: DISCONTINUED | OUTPATIENT
Start: 2024-04-24 | End: 2024-04-24 | Stop reason: SDUPTHER

## 2024-04-24 RX ORDER — SODIUM CHLORIDE 0.9 % (FLUSH) 0.9 %
5-40 SYRINGE (ML) INJECTION EVERY 12 HOURS SCHEDULED
Status: DISCONTINUED | OUTPATIENT
Start: 2024-04-24 | End: 2024-04-24 | Stop reason: HOSPADM

## 2024-04-24 RX ORDER — LIDOCAINE HYDROCHLORIDE 20 MG/ML
INJECTION, SOLUTION INTRAVENOUS PRN
Status: DISCONTINUED | OUTPATIENT
Start: 2024-04-24 | End: 2024-04-24 | Stop reason: SDUPTHER

## 2024-04-24 RX ORDER — PROPOFOL 10 MG/ML
INJECTION, EMULSION INTRAVENOUS PRN
Status: DISCONTINUED | OUTPATIENT
Start: 2024-04-24 | End: 2024-04-24 | Stop reason: SDUPTHER

## 2024-04-24 RX ORDER — NALOXONE HYDROCHLORIDE 0.4 MG/ML
INJECTION, SOLUTION INTRAMUSCULAR; INTRAVENOUS; SUBCUTANEOUS PRN
Status: DISCONTINUED | OUTPATIENT
Start: 2024-04-24 | End: 2024-04-24 | Stop reason: HOSPADM

## 2024-04-24 RX ORDER — METHOTREXATE 2.5 MG/1
15 TABLET ORAL WEEKLY
Status: ON HOLD | COMMUNITY
End: 2024-04-27 | Stop reason: HOSPADM

## 2024-04-24 RX ORDER — MIDAZOLAM HYDROCHLORIDE 1 MG/ML
INJECTION INTRAMUSCULAR; INTRAVENOUS PRN
Status: DISCONTINUED | OUTPATIENT
Start: 2024-04-24 | End: 2024-04-24 | Stop reason: SDUPTHER

## 2024-04-24 RX ORDER — SODIUM CHLORIDE 9 MG/ML
INJECTION, SOLUTION INTRAVENOUS PRN
Status: DISCONTINUED | OUTPATIENT
Start: 2024-04-24 | End: 2024-04-24 | Stop reason: HOSPADM

## 2024-04-24 RX ORDER — OXYCODONE HYDROCHLORIDE AND ACETAMINOPHEN 5; 325 MG/1; MG/1
1 TABLET ORAL EVERY 6 HOURS PRN
Qty: 28 TABLET | Refills: 0 | Status: SHIPPED | OUTPATIENT
Start: 2024-04-24 | End: 2024-05-01

## 2024-04-24 RX ORDER — FOLIC ACID 1 MG/1
1 TABLET ORAL DAILY
COMMUNITY

## 2024-04-24 RX ORDER — BUPIVACAINE HYDROCHLORIDE 5 MG/ML
INJECTION, SOLUTION PERINEURAL PRN
Status: DISCONTINUED | OUTPATIENT
Start: 2024-04-24 | End: 2024-04-24 | Stop reason: ALTCHOICE

## 2024-04-24 RX ORDER — FOLIC ACID 1 MG/1
1 TABLET ORAL DAILY
Status: DISCONTINUED | OUTPATIENT
Start: 2024-04-24 | End: 2024-04-27 | Stop reason: HOSPADM

## 2024-04-24 RX ORDER — FENTANYL CITRATE 50 UG/ML
INJECTION, SOLUTION INTRAMUSCULAR; INTRAVENOUS PRN
Status: DISCONTINUED | OUTPATIENT
Start: 2024-04-24 | End: 2024-04-24 | Stop reason: SDUPTHER

## 2024-04-24 RX ORDER — DEXAMETHASONE SODIUM PHOSPHATE 10 MG/ML
INJECTION, SOLUTION INTRAMUSCULAR; INTRAVENOUS PRN
Status: DISCONTINUED | OUTPATIENT
Start: 2024-04-24 | End: 2024-04-24 | Stop reason: SDUPTHER

## 2024-04-24 RX ADMIN — HYDROMORPHONE HYDROCHLORIDE 0.5 MG: 1 INJECTION, SOLUTION INTRAMUSCULAR; INTRAVENOUS; SUBCUTANEOUS at 22:07

## 2024-04-24 RX ADMIN — PIPERACILLIN AND TAZOBACTAM 3375 MG: 3; .375 INJECTION, POWDER, LYOPHILIZED, FOR SOLUTION INTRAVENOUS; PARENTERAL at 22:13

## 2024-04-24 RX ADMIN — FOLIC ACID 1 MG: 1 TABLET ORAL at 09:15

## 2024-04-24 RX ADMIN — SODIUM CHLORIDE: 900 INJECTION, SOLUTION INTRAVENOUS at 13:58

## 2024-04-24 RX ADMIN — ONDANSETRON 4 MG: 2 INJECTION INTRAMUSCULAR; INTRAVENOUS at 15:03

## 2024-04-24 RX ADMIN — HYDROMORPHONE HYDROCHLORIDE 0.5 MG: 1 INJECTION, SOLUTION INTRAMUSCULAR; INTRAVENOUS; SUBCUTANEOUS at 04:34

## 2024-04-24 RX ADMIN — FENTANYL CITRATE 100 MCG: 50 INJECTION, SOLUTION INTRAMUSCULAR; INTRAVENOUS at 14:59

## 2024-04-24 RX ADMIN — LIDOCAINE HYDROCHLORIDE 100 MG: 20 INJECTION, SOLUTION INTRAVENOUS at 14:59

## 2024-04-24 RX ADMIN — FENTANYL CITRATE 50 MCG: 50 INJECTION, SOLUTION INTRAMUSCULAR; INTRAVENOUS at 15:23

## 2024-04-24 RX ADMIN — FENTANYL CITRATE 50 MCG: 50 INJECTION, SOLUTION INTRAMUSCULAR; INTRAVENOUS at 15:38

## 2024-04-24 RX ADMIN — VANCOMYCIN HYDROCHLORIDE 1500 MG: 5 INJECTION, POWDER, LYOPHILIZED, FOR SOLUTION INTRAVENOUS at 00:51

## 2024-04-24 RX ADMIN — PIPERACILLIN AND TAZOBACTAM 3375 MG: 3; .375 INJECTION, POWDER, LYOPHILIZED, FOR SOLUTION INTRAVENOUS; PARENTERAL at 04:37

## 2024-04-24 RX ADMIN — FENTANYL CITRATE 50 MCG: 50 INJECTION, SOLUTION INTRAMUSCULAR; INTRAVENOUS at 15:11

## 2024-04-24 RX ADMIN — DEXAMETHASONE SODIUM PHOSPHATE 10 MG: 10 INJECTION, SOLUTION INTRAMUSCULAR; INTRAVENOUS at 15:02

## 2024-04-24 RX ADMIN — ENOXAPARIN SODIUM 30 MG: 100 INJECTION SUBCUTANEOUS at 22:14

## 2024-04-24 RX ADMIN — PROPOFOL 200 MG: 10 INJECTION, EMULSION INTRAVENOUS at 14:59

## 2024-04-24 RX ADMIN — MIDAZOLAM 2 MG: 1 INJECTION INTRAMUSCULAR; INTRAVENOUS at 14:53

## 2024-04-24 RX ADMIN — SODIUM CHLORIDE, PRESERVATIVE FREE 10 ML: 5 INJECTION INTRAVENOUS at 22:14

## 2024-04-24 RX ADMIN — HYDROMORPHONE HYDROCHLORIDE 0.5 MG: 1 INJECTION, SOLUTION INTRAMUSCULAR; INTRAVENOUS; SUBCUTANEOUS at 11:05

## 2024-04-24 RX ADMIN — PIPERACILLIN AND TAZOBACTAM 3375 MG: 3; .375 INJECTION, POWDER, LYOPHILIZED, FOR SOLUTION INTRAVENOUS; PARENTERAL at 13:36

## 2024-04-24 RX ADMIN — HYDROMORPHONE HYDROCHLORIDE 0.5 MG: 1 INJECTION, SOLUTION INTRAMUSCULAR; INTRAVENOUS; SUBCUTANEOUS at 07:33

## 2024-04-24 RX ADMIN — VANCOMYCIN HYDROCHLORIDE 1250 MG: 10 INJECTION, POWDER, LYOPHILIZED, FOR SOLUTION INTRAVENOUS at 13:38

## 2024-04-24 RX ADMIN — HYDROMORPHONE HYDROCHLORIDE 0.5 MG: 1 INJECTION, SOLUTION INTRAMUSCULAR; INTRAVENOUS; SUBCUTANEOUS at 01:23

## 2024-04-24 ASSESSMENT — PAIN SCALES - GENERAL
PAINLEVEL_OUTOF10: 6
PAINLEVEL_OUTOF10: 6
PAINLEVEL_OUTOF10: 4
PAINLEVEL_OUTOF10: 6
PAINLEVEL_OUTOF10: 8
PAINLEVEL_OUTOF10: 9
PAINLEVEL_OUTOF10: 9
PAINLEVEL_OUTOF10: 8

## 2024-04-24 ASSESSMENT — PAIN DESCRIPTION - DESCRIPTORS
DESCRIPTORS: ACHING;POUNDING;PRESSURE
DESCRIPTORS: JABBING;POUNDING;PRESSURE
DESCRIPTORS: ACHING;BURNING
DESCRIPTORS: ACHING
DESCRIPTORS: ACHING;BURNING;POUNDING

## 2024-04-24 ASSESSMENT — PAIN DESCRIPTION - PAIN TYPE: TYPE: ACUTE PAIN

## 2024-04-24 ASSESSMENT — PAIN DESCRIPTION - LOCATION
LOCATION: HAND
LOCATION: HAND
LOCATION: HAND;FINGER (COMMENT WHICH ONE)
LOCATION: HAND;FINGER (COMMENT WHICH ONE)
LOCATION: HAND

## 2024-04-24 ASSESSMENT — PAIN - FUNCTIONAL ASSESSMENT
PAIN_FUNCTIONAL_ASSESSMENT: WONG-BAKER FACES
PAIN_FUNCTIONAL_ASSESSMENT: PREVENTS OR INTERFERES SOME ACTIVE ACTIVITIES AND ADLS

## 2024-04-24 ASSESSMENT — PAIN DESCRIPTION - ORIENTATION
ORIENTATION: LEFT
ORIENTATION: RIGHT
ORIENTATION: LEFT

## 2024-04-24 NOTE — PROGRESS NOTES
Admitting Date and Time: 4/23/2024  9:07 PM  Admit Dx: Cellulitis [L03.90]  Cellulitis of finger of right hand [L03.011]  Sepsis due to cellulitis (HCC) [L03.90, A41.9]  Sepsis without acute organ dysfunction, due to unspecified organism (HCC) [A41.9]    Subjective:    Pt feels she smiles and giggles but also explains that her finger is in excruciating pain.   by bedside  Per RN: No issue    ROS: denies fever, chills, cp, sob, n/v, HA unless stated above.     vancomycin  1,250 mg IntraVENous Q12H    folic acid  1 mg Oral Daily    sodium chloride  1,000 mL IntraVENous Once    sodium chloride flush  10 mL IntraVENous 2 times per day    enoxaparin  30 mg SubCUTAneous BID    piperacillin-tazobactam  3,375 mg IntraVENous Q8H     HYDROmorphone, 0.5 mg, Q3H PRN  sodium chloride flush, 10 mL, PRN  sodium chloride, , PRN  promethazine, 12.5 mg, Q6H PRN   Or  ondansetron, 4 mg, Q6H PRN  polyethylene glycol, 17 g, Daily PRN  acetaminophen, 650 mg, Q6H PRN   Or  acetaminophen, 650 mg, Q6H PRN         Objective:    /62   Pulse 84   Temp 98.3 °F (36.8 °C) (Oral)   Resp 16   Ht 1.702 m (5' 7\")   Wt 101.3 kg (223 lb 6.4 oz)   SpO2 97%   BMI 34.99 kg/m²   General Appearance: alert and oriented to person, place and time and in no acute distress  Skin: warm and dry  Head: normocephalic and atraumatic  Eyes: pupils equal, round, and reactive to light, extraocular eye movements intact, conjunctivae normal  Neck: neck supple and non tender without mass   Pulmonary/Chest: clear to auscultation bilaterally- no wheezes, rales or rhonchi, normal air movement, no respiratory distress  Cardiovascular: normal rate, normal S1 and S2 and no carotid bruits  Abdomen: soft, non-tender, non-distended, normal bowel sounds, no masses or organomegaly  Extremities: no cyanosis, no clubbing and no  edema in lower extremities however right finger in question is thoroughly wrapped with surgery due to unwrap it and explore surgically  procedure follow-up on cultures.  2.  Rheumatoid arthritis recently on prednisone and methotrexate which she and I agree are likely not to be causational  3. DVT prophylaxis: Lovenox  Full code.  Disposition: Will need complete culture reports before discharge      NOTE: This report was transcribed using voice recognition software. Every effort was made to ensure accuracy; however, inadvertent computerized transcription errors may be present.     Electronically signed by ANG SMITH MD on 4/24/2024 at 9:29 AM

## 2024-04-24 NOTE — ANESTHESIA POSTPROCEDURE EVALUATION
Department of Anesthesiology  Postprocedure Note    Patient: Arminda Nelson  MRN: [id number]  YOB: 1965  Date of evaluation: 4/24/2024    Procedure Summary       Date: 04/24/24 Room / Location: [address]    Anesthesia Start: 1452 Anesthesia Stop: 1601    Procedure: RIGHT INDEX FINGER  INCISION AND DRAINAGE DEBRIDEMENT (Right: Hand) Diagnosis:       Cellulitis, unspecified cellulitis site      (Cellulitis, unspecified cellulitis site [L03.90])    Surgeons: Davis Shah DO Responsible Provider: Yuri Liu MD    Anesthesia Type: general, MAC ASA Status: 3            Anesthesia Type: No value filed.    James Phase I:      James Phase II:      Anesthesia Post Evaluation    Patient location during evaluation: PACU  Patient participation: waiting for patient participation  Level of consciousness: awake and alert  Pain score: 3  Airway patency: patent  Nausea & Vomiting: no nausea  Cardiovascular status: blood pressure returned to baseline  Respiratory status: acceptable  Hydration status: euvolemic  Pain management: adequate    No notable events documented.

## 2024-04-24 NOTE — PROGRESS NOTES
Pharmacy Consultation Note  (Antibiotic Dosing and Monitoring)    Initial consult date: 04/24/2024  Consulting physician/provider: ashley  Drug: Vancomycin  Indication: SSTI    Age/  Gender Height Weight IBW  Allergy Information   58 y.o./female 170.2 cm (5' 7\") 102.5 kg (225 lb 15.5 oz)     Ideal body weight: 61.6 kg (135 lb 12.9 oz)  Adjusted ideal body weight: 77.5 kg (170 lb 13.5 oz)   Patient has no known allergies.      Renal Function:  Recent Labs     04/23/24  2146   BUN 11   CREATININE 0.8     No intake or output data in the 24 hours ending 04/24/24 0154    Vancomycin Monitoring:  Trough:  No results for input(s): \"VANCOTROUGH\" in the last 72 hours.  Random:  No results for input(s): \"VANCORANDOM\" in the last 72 hours.    Vancomycin Administration Times:  Recent vancomycin administrations                     vancomycin (VANCOCIN) 1,500 mg in sodium chloride 0.9 % 250 mL IVPB (mg) 1,500 mg New Bag 04/24/24 0051                    Assessment:  Patient is a 58 y.o. female who has been initiated on vancomycin  Estimated Creatinine Clearance: 94 mL/min (based on SCr of 0.8 mg/dL).  To dose vancomycin, pharmacy will be utilizing [employer] calculation software for goal AUC/SERA 400-600 mg/L-hr (predicted AUC/SERA = 460, Tr =15.4 mcg/mL)    Plan:  Will trial vancomycin 1250 mg IV every 12 hours  Will check vancomycin levels when appropriate  Will continue to monitor renal function   Pharmacy to follow      Arun Garrett RPH 4/24/2024 1:54 AM    HUI: [phone]  SEY: [phone]  SJW: [phone]

## 2024-04-24 NOTE — PROGRESS NOTES
4 Eyes Skin Assessment     NAME:  Arminda Nelson  YOB: 1965  MEDICAL RECORD NUMBER:  [id number]    The patient is being assessed for  Admission    I agree that at least one RN has performed a thorough Head to Toe Skin Assessment on the patient. ALL assessment sites listed below have been assessed.      Areas assessed by both nurses:    Head, Face, Ears, Shoulders, Back, Chest, Arms, Elbows, Hands, Sacrum. Buttock, Coccyx, Ischium, and Legs. Feet and Heels        Does the Patient have a Wound? Yes wound(s) were present on assessment. LDA wound assessment was Initiated and completed by RN       Chester Prevention initiated by RN: No  Wound Care Orders initiated by RN: No    Pressure Injury (Stage 3,4, Unstageable, DTI, NWPT, and Complex wounds) if present, place Wound referral order by RN under : No    New Ostomies, if present place, Ostomy referral order under : No     Nurse 1 eSignature: Electronically signed by Abhijeet Beckham RN on 4/24/24 at 1:56 AM EDT    **SHARE this note so that the co-signing nurse can place an eSignature**    Nurse 2 eSignature: Electronically signed by NANETTE BRITTON RN on 4/24/24 at 1:57 AM EDT

## 2024-04-24 NOTE — ANESTHESIA PRE PROCEDURE
Department of Anesthesiology  Preprocedure Note       Name:  Arminda Nelson   Age:  58 y.o.  :  1965                                          MRN:  [id number]         Date:  2024      Surgeon: Surgeon(s):  Davis Shah DO    Procedure: Procedure(s):  RIGHT INDEX FINGER  INCISION AND DRAINAGE DEBRIDEMENT    Medications prior to admission:   Prior to Admission medications    Medication Sig Start Date End Date Taking? Authorizing Provider   folic acid (FOLVITE) 1 MG tablet Take 1 tablet by mouth daily   Yes ProviderPercy MD   methotrexate (RHEUMATREX) 2.5 MG chemo tablet Take 6 tablets by mouth once a week First dose was taken    Yes ProviderPercy MD       Current medications:    Current Facility-Administered Medications   Medication Dose Route Frequency Provider Last Rate Last Admin    HYDROmorphone (DILAUDID) injection 0.5 mg  0.5 mg IntraVENous Q3H PRN Libia Castle MD   0.5 mg at 24 1105    vancomycin (VANCOCIN) 1,250 mg in sodium chloride 0.9 % 250 mL IVPB  1,250 mg IntraVENous Q12H Libia Castle .7 mL/hr at 24 1338 1,250 mg at 24 1338    folic acid (FOLVITE) tablet 1 mg  1 mg Oral Daily Libia Castle MD   1 mg at 24 0915    sodium chloride 0.9 % bolus 1,000 mL  1,000 mL IntraVENous Once Mayur Lewis DO        sodium chloride flush 0.9 % injection 10 mL  10 mL IntraVENous 2 times per day Libia Castle MD        sodium chloride flush 0.9 % injection 10 mL  10 mL IntraVENous PRN Libia Castle MD        0.9 % sodium chloride infusion   IntraVENous PRN Libia Castle MD        enoxaparin Sodium (LOVENOX) injection 30 mg  30 mg SubCUTAneous BID Libia Castle MD        promethazine (PHENERGAN) tablet 12.5 mg  12.5 mg Oral Q6H PRN Libia Castle MD        Or    ondansetron (ZOFRAN) injection 4 mg  4 mg IntraVENous Q6H PRN Libia Castle MD        polyethylene glycol (GLYCOLAX) packet 17 g  17 g Oral Daily PRN Corrine

## 2024-04-24 NOTE — ED PROVIDER NOTES
[facility] EMERGENCY DEPARTMENT  EMERGENCY DEPARTMENT ENCOUNTER        Pt Name: Arminda Nelson  MRN: [id number]  Birthdate 1965  Date of evaluation: 4/23/2024  Provider: Mayur Lewis DO  PCP: Mayank Deluca MD  Note Started: 9:15 PM EDT 4/23/24    CHIEF COMPLAINT       Chief Complaint   Patient presents with    Wound Check     Got splinter on Sunday and arm is now red and swollen going up the arm. Was already placed on Augmentin. Pain is 10/10       HISTORY OF PRESENT ILLNESS: 1 or more Elements   Arminda Nelson is a 58 y.o. female who presents to the emergency department with chief complaint of right index finger wound.  Patient states that on Sunday she got a splinter in the palmar surface of her index finger and at the wound and got the splinter out.  States that she was seen in urgent care and splinter was not remaining.  Patient states that this morning she went to her family doctor because she was having worsening swelling in the right index finger as well as the dorsum of the right hand with streaking going up the right arm and her family doctor placed her on Augmentin and stated that if the pain got worse that she needed to come to the emergency department.  Patient states the pain is not 10 out of 10 and she is having a difficult time bending the right index finger is having worsening pain in the hand with streaking going up the forearm.  She states that she only took 2 doses of the Augmentin but the pain is getting worse.  Patient denies any fever or chills, nausea, vomiting, chest pain, shortness of breath, abdominal pain, hematuria or dysuria, constipation or diarrhea.  Does take methotrexate for immune disease.    Nursing Notes were all reviewed and agreed with or any disagreements were addressed in the HPI.    REVIEW OF SYSTEMS :      Positives and Pertinent negatives as per HPI.     PAST MEDICAL HISTORY/Chronic Conditions Affecting Care      has a past  fracture/dislocations.  Spoke with orthopedic surgery who recommends admission for IV antibiotics and potential procedural washout tomorrow.  Spoke with hospitalist who accepts patient for admission at this time.    Extensively reviewed lab and imaging findings with patient/family members/available representative parties and all questions answered at this time to the fullest extent possible.  Shared decision making utilized with patient and present parties, and due to their presenting conditions patient to be admitted to the hospital for inpatient management and monitoring.  Patient has remained closely monitored with multiple reevaluations and complex medical decision making throughout the course of their emergency department stay.  They have clinically improved and have hemodynamically improved.  After emergency department management is complete, patient to be admitted to the hospital.    Differential diagnosis includes but is not limited to: Flexor tenosynovitis septicemia, cellulitis, bacteremia, necrotizing fasciitis    Please refer to the ED Course as available for additional MDM.  ED Course as of 04/23/24 2326   Tue Apr 23, 2024   2201 Patient presents with pain, swelling, and erythema of her right index finger.  Progressively worsening over the last 2 days since she had a splinter in her right index finger.  Had fever to 100.4 F this morning.  Associated streaking of redness the hand and arm.  Had evaluation by primary care and does not think there is still a splinter in there.  Patient is right-handed.  On exam patient is tachycardic, afebrile, hemodynamically stable.  Right index finger is in partially flexed position at rest, there is pain with passive extension.  There is erythema, warmth, induration, and tenderness to palpation along the dorsal aspect of the right index finger and hand with lymphangitic streaking on the right forearm.  No obvious crepitus or fluctuance.  There is also an area on the

## 2024-04-24 NOTE — DISCHARGE INSTRUCTIONS
Your information:  Name: Arminda Nelson  : 1965    Your instructions:    Discharge home.  Follow up with primary care provider and specialists as directed.  Remain nonweightbearing to right arm. Use Ten pillow and keep are arm elevated.  Restart Methotrexate when you rheumatologist recommends too.      Orthopaedics Discharge Instructions   NWB on right upper extremity  Pain medication Per Prescriptions  Contact Office for Medication Refill- [phone]  Office can refill pain med every 7 days  If patient discharging to facility then pain control will be continued per facility physician  Ice to operative/injured site for 15-30 minutes of each hour for next 5 days    Recommend that you continue to ice the area 2-3 times per day after this   Elevate operative/injured limb on 2 pillows at home  Goal is to have limb above the heart if able  Wound care -dressings remain clean dry intact.  On postoperative day 7 can be removed and surgical site can be clean with soapy water.    Call the office at [phone] for directions or with any questions.  Watch for these significant complications.  Call physician if they or any other problems occur:  Fever over 101°, redness, swelling or warmth at the operative site  Unrelieved nausea    Foul smelling or cloudy drainage at the operative site   Unrelieved pain    Blood soaked dressing. (Some oozing may be normal)     Numb, pale, blue, cold or tingling extremity     What to do after you leave the hospital:    Recommended diet: regular diet    Recommended activity: activity as tolerated    The following personal items were collected during your admission and were returned to you:    Belongings  Dental Appliances: None  Vision - Corrective Lenses: None  Hearing Aid: None  Clothing: Shirt, Pants, Footwear, Socks, At bedside  Jewelry: Necklace, Ring, At bedside  Electronic Devices: Cell Phone  Weapons (Notify Protective Services/Security): None  Home Medications:  None  Valuables Given To: Patient  Provide Name(s) of Who Valuable(s) Were Given To: Jackelyn    Information obtained by:  By signing below, I understand that if any problems occur once I leave the hospital I am to contact Dr. Deluca.  I understand and acknowledge receipt of the instructions indicated above.

## 2024-04-24 NOTE — CARE COORDINATION
Case Management Assessment  Initial Evaluation    Date/Time of Evaluation: 4/24/2024 10:44 AM  Assessment Completed by: Bibiana Granger RN    If patient is discharged prior to next notation, then this note serves as note for discharge by case management.    Patient Name: Arminda Nelson                   YOB: 1965  Diagnosis: Cellulitis [L03.90]  Cellulitis of finger of right hand [L03.011]  Sepsis due to cellulitis (HCC) [L03.90, A41.9]  Sepsis without acute organ dysfunction, due to unspecified organism (HCC) [A41.9]                   Date / Time: 4/23/2024  9:07 PM    Patient Admission Status: Observation   Readmission Risk (Low < 19, Mod (19-27), High > 27): No data recorded  Current PCP: Mayank Deluca MD  PCP verified by ? Yes    Chart Reviewed: Yes      History Provided by: Patient  Patient Orientation: Alert and Oriented    Patient Cognition: Alert    Hospitalization in the last 30 days (Readmission):  No    If yes, Readmission Assessment in  Navigator will be completed.    Advance Directives:      Code Status: Full Code   Patient's Primary Decision Maker is: Legal Next of Kin    Primary Decision Maker: Elias Nelson - Spouse - [phone]    Secondary Decision Maker: Christy Sanchez - Brother/Sister - [phone]    Discharge Planning:    Patient lives with: Spouse/Significant Other Type of Home: House  Primary Care Giver: Self  Patient Support Systems include: Spouse/Significant Other, Family Members     ADLS  Prior functional level: Independent in ADLs/IADLs  Current functional level: Independent in ADLs/IADLs      Family can provide assistance at DC: Yes  Would you like Case Management to discuss the discharge plan with any other family members/significant others, and if so, who? 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 [place] LOCAL / Product Type:  *No Product type* /         [id number] - [place], [place] - [address] - P [phone] - F [phone]  [address] [place] [address]  Phone: [phone] Fax: [phone]    [facility], [place] - [address] [phone] - F [phone]  [address] [place] [address]  Phone: [phone] Fax: [phone]      Notes:    Factors facilitating achievement of predicted outcomes: Family support    Barriers to discharge: none     Additional Case Management Notes: 4-24-Cm note: met with pt for transition of care, pt lives with her  , she works and is independent, no needs identified, pt for I&D of right hand this afternooon, cm following for Abx's . Electronically signed by Bibiana Granger RN on 4/24/2024 at 10:48 AM      The Plan for Transition of Care is related to the following treatment goals of Cellulitis [L03.90]  Cellulitis of finger of right hand [L03.011]  Sepsis due to cellulitis (HCC) [L03.90, A41.9]  Sepsis without acute organ dysfunction, due to unspecified organism (HCC) [A41.9]      Bibiana Granger RN  Case Management Department  Ph: [phone] Fax: [phone]

## 2024-04-24 NOTE — OP NOTE
Operative Note      Patient: Arminda Nelson  YOB: 1965  MRN: [id number]    Date of Procedure: 4/24/2024    Pre-Op Diagnosis Codes:     * Cellulitis, unspecified cellulitis site [L03.90]    Post-Op Diagnosis:  Flexor tenosynovitis right index finger       Procedure(s):  RIGHT INDEX FINGER  INCISION AND DRAINAGE DEBRIDEMENT  Right index finger wound exploration  Right index finger tendon sheath decompression    Surgeon(s):  Davis Shah DO    Assistant:   Resident: Jovany Mcbride DO; Joseph Narvaez DO    Anesthesia: Monitor Anesthesia Care    Estimated Blood Loss (mL): Minimal    Complications: None    Specimens:   ID Type Source Tests Collected by Time Destination   1 : RIGHT INDEX FINGER WOUND SWAB - ANAEROBIC AND AEROBIC Specimen Finger CULTURE, ANAEROBIC, CULTURE, SURGICAL Davis Shah DO 4/24/2024 1520        Implants:  * No implants in log *      Drains: * No LDAs found *    Findings:  Infection Present At Time Of Surgery (PATOS) (choose all levels that have infection present):  - Deep Infection (muscle/fascia) present as evidenced by purulent fluid  Other Findings: Hemorrhagic hematoma ulnar aspect of the index finger at the middle phalanx    Detailed Description of Procedure:   FINDINGS:  1. Evidence of median nerve compression beneath the transverse carpal  ligament. It was adequately decompressed with release of the transverse  carpal ligament.  2. Status post decompression, the median nerve was fully decompressed  throughout its course including distal forearm fascia through the carpal  tunnel to its trifurcation distally    OPERATIVE INDICATION:   Brief history: Arminda Nelson 58 y.o. female presented [facility] emergency department for right index finger pain that occurred approximately 2 days ago.  Patient stated she had a splinter stuck on the volar aspect of the finger which subsequently led to soft tissue edema, erythema and pain.  Patient presented with all 4

## 2024-04-24 NOTE — ACP (ADVANCE CARE PLANNING)
Advance Care Planning   Healthcare Decision Maker:    Primary Decision Maker: Elias Nelson - Spouse - [phone]    Secondary Decision Maker: Christy Sanchez - Brother/Sister - [phone]      Today we documented Decision Maker(s) consistent with Legal Next of Kin hierarchy.

## 2024-04-24 NOTE — H&P
by mouth once a week First dose was taken Friday 4/19   Yes Provider, MD Percy       Allergies:    Patient has no known allergies.    Social History:    reports that she has never smoked. She has never used smokeless tobacco. She reports current alcohol use. She reports that she does not use drugs.      Family History:   family history includes Asthma in her maternal aunt.      PHYSICAL EXAM:  Vitals:  /62   Pulse 84   Temp 98.3 °F (36.8 °C) (Oral)   Resp 16   Ht 1.702 m (5' 7\")   Wt 101.3 kg (223 lb 6.4 oz)   SpO2 97%   BMI 34.99 kg/m²   GENERAL: No acute distress, Alert and awake, Afebrile   HEENT: PERRLA, no icterus. OP clear and no exudates .  NECK: Supple  no carotid/ophthalmic bruits, JVD None.  RESPIRATORY:  Bilateral equal vesicular breath sound with no wheezing. Lung bases are clear.    HEART: Normal S1 and S2, No added sounds.   ABDOMEN: Soft, nondistended, nontender.   EXTREMITIES: All peripheral pulses are present. No calf tenderness or swelling. No pedal edema is present..  Right hand:           NEUROLOGY: Alert and awake. No new focal neuro deficit.     LABS:  Recent Labs     04/23/24  2146 04/24/24  0524   WBC 12.9* 6.2   RBC 3.73 3.50   HGB 10.8* 10.2*   HCT 30.5* 29.3*   MCV 81.8 83.7   MCH 29.0 29.1   MCHC 35.4* 34.8*   RDW 14.5 14.7    194   MPV 10.3 10.7     Recent Labs     04/23/24  2146   *   K 3.8   CL 97*   CO2 19*   BUN 11   CREATININE 0.8   GLUCOSE 113*   CALCIUM 8.1*     No results for input(s): \"POCGLU\" in the last 72 hours.  Results for orders placed or performed during the hospital encounter of 04/23/24   Culture, Blood 2    Specimen: Blood   Result Value Ref Range    Specimen Description .BLOOD .ARM     Special Requests          Culture NO GROWTH <24 HRS    Blood Culture 1    Specimen: Blood   Result Value Ref Range    Specimen Description .BLOOD .ARM     Special Requests          Culture NO GROWTH <24 HRS    CMP   Result Value Ref Range    Sodium 129  PROVIDED HISTORY: right index finger wound, concern for nec fasc TECHNOLOGIST PROVIDED HISTORY: Reason for exam:->right index finger wound, concern for nec fasc FINDINGS: Diffuse soft tissue swelling of the index finger and hand is identified.  No soft tissue gas is seen.  No radiopaque foreign body visualized.  No osseous destruction is noted.  No acute fracture.  Joint alignment is intact.     Index finger and hand diffuse soft tissue swelling.  No soft tissue gas identified.  No acute osseous abnormality.     ASSESSMENT:    Present on Admission:   Cellulitis    PLAN:  # Right Hand Cellulitis - rule out sepsis   Right index finger tenosynovitis + hand cellulitis   Xray showed no gas or OM   IV Vanc + Zosyn   Ortho consulted and might do procedure in AM   F/u cultures  # H/o rheumatoid arthritis   Continue folic acid  # Rest of the chronic medical problems are stable and will be managed with appropriately with home medications, placed nursing communication order to verify home medications before giving them to the patient.  # Diet & Fluid as on chart, ordered.  # Code Status: Full code  # DVT Prophylaxis : As ordered on chart  The patient at bedside was counseled about clinical status, laboratory/imaging results, diagnoses, medication side effects, risk, and treatment plan, all questions were answered to patient's satisfaction and verbalized understanding      SIGNATURE: Libia Castle MD PATIENT NAME: Arminda Nelson   CONTACT #: Hospitalist on call MRN: [id number]     Disclaimer: Portions of this note may have been generated using Dragon voice recognition software. Reasonable efforts were made to correct any dictation errors that resulted due to the programming of this software but some may still be present.

## 2024-04-24 NOTE — PROGRESS NOTES
Department of Orthopedic Surgery  Resident Progress Note    Patient seen and examined, reports continued pain in right upper extremity.  Ten pillow not in room, but patient states she has had elevated with blankets.      VITALS:  /62   Pulse 84   Temp 98.3 °F (36.8 °C) (Oral)   Resp 16   Ht 1.702 m (5' 7\")   Wt 101.3 kg (223 lb 6.4 oz)   SpO2 97%   BMI 34.99 kg/m²     General: alert and oriented to person, place and time, well-developed and well-nourished, in no acute distress    MUSCULOSKELETAL:   right upper extremity:  Flexed posture about index finger, circumferential edema about index finger, small puncture wound about volar aspect DIP index finger with minimal discharge.  Blood blister noted at ulnar aspect index finger extending from DIP to just proximal to PIP joint.   + TTP about volar aspect of index finger  Pain with passive extension  Hand/ compartment soft compressible  And/PIN/ulnar function grossly intact  2+/4 radial pulse, hand warm well-perfused  Sensation grossly intact radial/ulnar/median nerve distributions hand      CBC:   Lab Results   Component Value Date/Time    WBC 6.2 04/24/2024 05:24 AM    HGB 10.2 04/24/2024 05:24 AM    HCT 29.3 04/24/2024 05:24 AM     04/24/2024 05:24 AM     PT/INR:  No results found for: \"PROTIME\", \"INR\"    ASSESSMENT  Right hand flexor tenosynovitis    PLAN      Strict elevation right upper extremity, Ten pillow   Continue IV antibiotics  N.p.o.  Tentatively plan for OR today for incision and debridement about right hand      Electronically signed by Jovany Mcbride DO on 4/24/2024 at 6:53 AM

## 2024-04-24 NOTE — PLAN OF CARE
Problem: Discharge Planning  Goal: Discharge to home or other facility with appropriate resources  Outcome: Progressing     Problem: ABCDS Injury Assessment  Goal: Absence of physical injury  Outcome: Progressing     Problem: Pain  Goal: Verbalizes/displays adequate comfort level or baseline comfort level  Outcome: Progressing     Problem: Gastrointestinal - Adult  Goal: Minimal or absence of nausea and vomiting  Outcome: Progressing     Problem: Hematologic - Adult  Goal: Maintains hematologic stability  Outcome: Progressing

## 2024-04-24 NOTE — CONSULTS
Department of Orthopedic Surgery  Resident Consult Note    Reason for Consult: Right hand pain    HISTORY OF PRESENT ILLNESS:       Patient is a 58 y.o. right-hand-dominant female who presents with right hand pain.  Patient states Sunday she suffered a splinter about distal volar aspect of index finger.  Patient reports she attempted to remove splinter, today she experienced increased pain, redness, warmth about finger when she reported to her primary care doctor.  She was placed on antibiotics with no improvement.  Patient reports increased pain, redness, warmth extending proximally into hand/forearm.  Patient reports she has a history of Sjogren syndrome with previous erythema extending proximally into the forearm when she was diagnosed.  Denies any other orthopedic complaints this time.  Denies distal numbness/tingling/paresthesias.    Past Medical History:        Diagnosis Date    Arthritis     right knee     Carpal tunnel syndrome, left      Past Surgical History:        Procedure Laterality Date    BREAST CYST EXCISION      BREAST LUMPECTOMY Right 1995    CARPAL TUNNEL RELEASE Left 3/6/2020    LEFT CARPAL TUNNEL RELEASE performed by Mustapha Graves MD at [facility] OR    HYSTERECTOMY (CERVIX STATUS UNKNOWN)      2011    KNEE ARTHROSCOPY      KNEE SURGERY Right     US BREAST FINE NEEDLE ASPIRATION Right     Right breast cyst asp. [id number] per mammo hx sheet     Current Medications:   Current Facility-Administered Medications: sodium chloride 0.9 % bolus 1,000 mL, 1,000 mL, IntraVENous, Once  vancomycin (VANCOCIN) 1,500 mg in sodium chloride 0.9 % 250 mL IVPB, 20 mg/kg (Adjusted), IntraVENous, Once  piperacillin-tazobactam (ZOSYN) 4,500 mg in sodium chloride 0.9 % 100 mL IVPB ([id number]), 4,500 mg, IntraVENous, Once  clindamycin (CLEOCIN) 900 mg in dextrose 5 % 50 mL IVPB, 900 mg, IntraVENous, Once  Allergies:  Patient has no known allergies.    Social History:   TOBACCO:   reports that she has never smoked. She has

## 2024-04-25 LAB
CREAT SERPL-MCNC: 0.8 MG/DL (ref 0.5–1)
DATE LAST DOSE: ABNORMAL
GFR SERPL CREATININE-BSD FRML MDRD: 85 ML/MIN/1.73M2
TME LAST DOSE: 200 H
VANCOMYCIN DOSE: 1025 MG
VANCOMYCIN TROUGH SERPL-MCNC: 4.6 UG/ML (ref 5–16)

## 2024-04-25 PROCEDURE — 96366 THER/PROPH/DIAG IV INF ADDON: CPT

## 2024-04-25 PROCEDURE — 96367 TX/PROPH/DG ADDL SEQ IV INF: CPT

## 2024-04-25 PROCEDURE — 96365 THER/PROPH/DIAG IV INF INIT: CPT

## 2024-04-25 PROCEDURE — 6360000002 HC RX W HCPCS: Performed by: PHYSICAL THERAPY ASSISTANT

## 2024-04-25 PROCEDURE — 2580000003 HC RX 258: Performed by: INTERNAL MEDICINE

## 2024-04-25 PROCEDURE — 36415 COLL VENOUS BLD VENIPUNCTURE: CPT

## 2024-04-25 PROCEDURE — 96372 THER/PROPH/DIAG INJ SC/IM: CPT

## 2024-04-25 PROCEDURE — 6360000002 HC RX W HCPCS: Performed by: INTERNAL MEDICINE

## 2024-04-25 PROCEDURE — 99232 SBSQ HOSP IP/OBS MODERATE 35: CPT | Performed by: INTERNAL MEDICINE

## 2024-04-25 PROCEDURE — G0378 HOSPITAL OBSERVATION PER HR: HCPCS

## 2024-04-25 PROCEDURE — 1200000000 HC SEMI PRIVATE

## 2024-04-25 PROCEDURE — 82565 ASSAY OF CREATININE: CPT

## 2024-04-25 PROCEDURE — 2580000003 HC RX 258: Performed by: PHYSICAL THERAPY ASSISTANT

## 2024-04-25 PROCEDURE — 6370000000 HC RX 637 (ALT 250 FOR IP): Performed by: PHYSICAL THERAPY ASSISTANT

## 2024-04-25 PROCEDURE — 80202 ASSAY OF VANCOMYCIN: CPT

## 2024-04-25 RX ADMIN — VANCOMYCIN HYDROCHLORIDE 1750 MG: 10 INJECTION, POWDER, LYOPHILIZED, FOR SOLUTION INTRAVENOUS at 21:59

## 2024-04-25 RX ADMIN — PIPERACILLIN AND TAZOBACTAM 3375 MG: 3; .375 INJECTION, POWDER, LYOPHILIZED, FOR SOLUTION INTRAVENOUS; PARENTERAL at 05:57

## 2024-04-25 RX ADMIN — ACETAMINOPHEN 650 MG: 325 TABLET ORAL at 22:01

## 2024-04-25 RX ADMIN — ACETAMINOPHEN 650 MG: 325 TABLET ORAL at 16:30

## 2024-04-25 RX ADMIN — SODIUM CHLORIDE, PRESERVATIVE FREE 10 ML: 5 INJECTION INTRAVENOUS at 21:59

## 2024-04-25 RX ADMIN — ENOXAPARIN SODIUM 30 MG: 100 INJECTION SUBCUTANEOUS at 22:00

## 2024-04-25 RX ADMIN — VANCOMYCIN HYDROCHLORIDE 1250 MG: 10 INJECTION, POWDER, LYOPHILIZED, FOR SOLUTION INTRAVENOUS at 02:17

## 2024-04-25 RX ADMIN — PIPERACILLIN AND TAZOBACTAM 3375 MG: 3; .375 INJECTION, POWDER, LYOPHILIZED, FOR SOLUTION INTRAVENOUS; PARENTERAL at 16:25

## 2024-04-25 RX ADMIN — ENOXAPARIN SODIUM 30 MG: 100 INJECTION SUBCUTANEOUS at 09:16

## 2024-04-25 ASSESSMENT — PAIN SCALES - GENERAL
PAINLEVEL_OUTOF10: 3
PAINLEVEL_OUTOF10: 3
PAINLEVEL_OUTOF10: 1

## 2024-04-25 ASSESSMENT — PAIN DESCRIPTION - DESCRIPTORS
DESCRIPTORS: GNAWING
DESCRIPTORS: THROBBING

## 2024-04-25 ASSESSMENT — PAIN DESCRIPTION - ORIENTATION
ORIENTATION: RIGHT
ORIENTATION: RIGHT

## 2024-04-25 ASSESSMENT — PAIN DESCRIPTION - LOCATION
LOCATION: HAND
LOCATION: HAND

## 2024-04-25 ASSESSMENT — PAIN SCALES - WONG BAKER: WONGBAKER_NUMERICALRESPONSE: NO HURT

## 2024-04-25 NOTE — PLAN OF CARE
Problem: Discharge Planning  Goal: Discharge to home or other facility with appropriate resources  Outcome: Progressing     Problem: ABCDS Injury Assessment  Goal: Absence of physical injury  Outcome: Progressing     Problem: Pain  Goal: Verbalizes/displays adequate comfort level or baseline comfort level  Outcome: Progressing     Problem: Gastrointestinal - Adult  Goal: Minimal or absence of nausea and vomiting  Outcome: Progressing     Problem: Hematologic - Adult  Goal: Maintains hematologic stability  Outcome: Progressing     Problem: Safety - Adult  Goal: Free from fall injury  Outcome: Progressing

## 2024-04-25 NOTE — PROGRESS NOTES
Admitting Date and Time: 4/23/2024  9:07 PM  Admit Dx: Cellulitis [L03.90]  Cellulitis of finger of right hand [L03.011]  Sepsis due to cellulitis (HCC) [L03.90, A41.9]  Sepsis without acute organ dysfunction, due to unspecified organism (HCC) [A41.9]    Subjective:    finger pain is much improved.  Friend by bedside  Per RN: No issue    ROS: denies fever, chills, cp, sob, n/v, HA unless stated above.     vancomycin  1,250 mg IntraVENous Q12H    folic acid  1 mg Oral Daily    sodium chloride  1,000 mL IntraVENous Once    sodium chloride flush  10 mL IntraVENous 2 times per day    enoxaparin  30 mg SubCUTAneous BID    piperacillin-tazobactam  3,375 mg IntraVENous Q8H     HYDROmorphone, 0.5 mg, Q3H PRN  sodium chloride flush, 10 mL, PRN  sodium chloride, , PRN  promethazine, 12.5 mg, Q6H PRN   Or  ondansetron, 4 mg, Q6H PRN  polyethylene glycol, 17 g, Daily PRN  acetaminophen, 650 mg, Q6H PRN   Or  acetaminophen, 650 mg, Q6H PRN         Objective:    /76   Pulse 86   Temp 97.4 °F (36.3 °C) (Oral)   Resp 20   Ht 1.702 m (5' 7\")   Wt 101.3 kg (223 lb 6.4 oz)   SpO2 99%   BMI 34.99 kg/m²   General Appearance: alert and oriented to person, place and time and in no acute distress  Skin: warm and dry  Head: normocephalic and atraumatic  Eyes: pupils equal, round, and reactive to light, extraocular eye movements intact, conjunctivae normal  Neck: neck supple and non tender without mass   Pulmonary/Chest: clear to auscultation bilaterally- no wheezes, rales or rhonchi, normal air movement, no respiratory distress  Cardiovascular: normal rate, normal S1 and S2 and no carotid bruits  Abdomen: soft, non-tender, non-distended, normal bowel sounds, no masses or organomegaly  Extremities: no cyanosis, no clubbing and no  edema in lower extremities however right finger in question is thoroughly wrapped with surgery due to unwrap it therefore my exam deferred.  Previous picture reviewed  Neurologic: no cranial nerve

## 2024-04-25 NOTE — CARE COORDINATION
4-25-Cm note: pt had I&D of finger from a splinter, she has a Ten Pillow, pt is independent, she works so if IV Abx's are required she would prefer to go to the [facility]fusion [facility] so hopefully she can be on something thats daily. Other than that pt will go home with no needs. Electronically signed by Bibiana Granger RN on 4/25/2024 at 2:09 PM

## 2024-04-25 NOTE — PROGRESS NOTES
Addendum:    SCr 0.8, trough @ 1424 = 4.6 mcg/mL, AUC/SERA <400 mg/L-hr  Adjust dose to Vancomycin 1,750 mg IV Q12H  Repeat level on new regimen when appropriate    Acacia Diggs PharmD, BCPS 4/25/2024 5:15 PM   [phone]     Pharmacy Consultation Note  (Antibiotic Dosing and Monitoring)    Initial consult date: 04/24/2024  Consulting physician/provider: ashley  Drug: Vancomycin  Indication: SSTI    Age/  Gender Height Weight IBW  Allergy Information   58 y.o./female 170.2 cm (5' 7\") 102.5 kg (225 lb 15.5 oz)     Ideal body weight: 61.6 kg (135 lb 12.9 oz)  Adjusted ideal body weight: 77.5 kg (170 lb 13.5 oz)   Patient has no known allergies.      Renal Function:  Recent Labs     04/23/24  2146   BUN 11   CREATININE 0.8         Intake/Output Summary (Last 24 hours) at 4/25/2024 1248  Last data filed at 4/24/2024 1550  Gross per 24 hour   Intake 400 ml   Output 5 ml   Net 395 ml       Vancomycin Monitoring:  Trough:  No results for input(s): \"VANCOTROUGH\" in the last 72 hours.  Random:  No results for input(s): \"VANCORANDOM\" in the last 72 hours.    Vancomycin Administration Times:  Recent vancomycin administrations                     vancomycin (VANCOCIN) 1,250 mg in sodium chloride 0.9 % 250 mL IVPB (mg) 1,250 mg New Bag 04/25/24 0217     1,250 mg New Bag 04/24/24 1338    vancomycin (VANCOCIN) 1,500 mg in sodium chloride 0.9 % 250 mL IVPB (mg) 1,500 mg New Bag 04/24/24 0051                    Assessment:  Patient is a 58 y.o. female who has been initiated on vancomycin  Estimated Creatinine Clearance: 94 mL/min (based on SCr of 0.8 mg/dL).  To dose vancomycin, pharmacy will be utilizing [employer] calculation software for goal AUC/SERA 400-600 mg/L-hr (predicted AUC/SERA = 460, Tr =15.4 mcg/mL)    Plan:  Vancomycin 1250 mg IV every 12 hours  Check trough & SCr now, prior to afternoon dose  Will continue to monitor renal function   Pharmacy to follow      Acacia Diggs PharmD, BCPS 4/25/2024 12:49 PM   [phone]

## 2024-04-25 NOTE — PLAN OF CARE
Problem: Discharge Planning  Goal: Discharge to home or other facility with appropriate resources  4/25/2024 1936 by Sam Howell RN  Outcome: Progressing  4/25/2024 1050 by Zoe Garcia RN  Outcome: Progressing     Problem: ABCDS Injury Assessment  Goal: Absence of physical injury  4/25/2024 1936 by Sam Howell RN  Outcome: Progressing  4/25/2024 1050 by Zoe Garcia RN  Outcome: Progressing     Problem: Pain  Goal: Verbalizes/displays adequate comfort level or baseline comfort level  4/25/2024 1936 by Sam Howell RN  Outcome: Progressing  4/25/2024 1050 by Zoe Garcia RN  Outcome: Progressing     Problem: Gastrointestinal - Adult  Goal: Minimal or absence of nausea and vomiting  4/25/2024 1936 by Sam Howell RN  Outcome: Progressing  4/25/2024 1050 by Zoe Garcia RN  Outcome: Progressing     Problem: Hematologic - Adult  Goal: Maintains hematologic stability  4/25/2024 1936 by Sam Howell RN  Outcome: Progressing  4/25/2024 1050 by Zoe Garcia RN  Outcome: Progressing

## 2024-04-25 NOTE — PLAN OF CARE
Problem: Discharge Planning  Goal: Discharge to home or other facility with appropriate resources  4/25/2024 1050 by Zoe Garcia RN  Outcome: Progressing  4/25/2024 0307 by Sam Howell RN  Outcome: Progressing     Problem: ABCDS Injury Assessment  Goal: Absence of physical injury  4/25/2024 1050 by Zoe Garcia RN  Outcome: Progressing  4/25/2024 0307 by Sam Howell RN  Outcome: Progressing     Problem: Pain  Goal: Verbalizes/displays adequate comfort level or baseline comfort level  4/25/2024 1050 by Zoe Garcia RN  Outcome: Progressing  4/25/2024 0307 by Sam Howell RN  Outcome: Progressing     Problem: Gastrointestinal - Adult  Goal: Minimal or absence of nausea and vomiting  4/25/2024 1050 by Zoe Garcia RN  Outcome: Progressing  4/25/2024 0307 by Sam Howell RN  Outcome: Progressing     Problem: Hematologic - Adult  Goal: Maintains hematologic stability  4/25/2024 1050 by Zoe Garcia RN  Outcome: Progressing  4/25/2024 0307 by Sam Howell RN  Outcome: Progressing     Problem: Safety - Adult  Goal: Free from fall injury  4/25/2024 1050 by Zoe Garcia RN  Outcome: Progressing  4/25/2024 0307 by Sam Howell RN  Outcome: Progressing

## 2024-04-25 NOTE — PROGRESS NOTES
Department of Orthopedic Surgery  Resident Progress Note      SUBJECTIVE  Patient seen and examined. Pain very well controlled. No new complaints. Denies acute fever, chills, nausea, vomiting , chest pain and shortness of breath.      OBJECTIVE    Physical    VITALS:  /76   Pulse 86   Temp 97.4 °F (36.3 °C) (Oral)   Resp 20   Ht 1.702 m (5' 7\")   Wt 101.3 kg (223 lb 6.4 oz)   SpO2 99%   BMI 34.99 kg/m²   MUSCULOSKELETAL:     right upper extremity:  Dressing C/D/I  Distal sensory intact: MRU.  Sensation to the radial ulnar, volar and dorsal aspect of the index finger.  +AIN/PIN/M/R/U nerve function intact grossly.  Patient displays active DIP and PIP flexion extension although minimal due to dressing.  +2/4 Rad pulse, cap refill <3 seconds, fingers are warm and brisk.  Compartments soft and compressible    Data    CBC:   Lab Results   Component Value Date/Time    WBC 6.2 04/24/2024 05:24 AM    RBC 3.50 04/24/2024 05:24 AM    HGB 10.2 04/24/2024 05:24 AM    HCT 29.3 04/24/2024 05:24 AM    MCV 83.7 04/24/2024 05:24 AM    MCH 29.1 04/24/2024 05:24 AM    MCHC 34.8 04/24/2024 05:24 AM    RDW 14.7 04/24/2024 05:24 AM     04/24/2024 05:24 AM    MPV 10.7 04/24/2024 05:24 AM     PT/INR:  No results found for: \"PROTIME\", \"INR\"    Labs  No results for input(s): \"BC\", \"BLOODCULT2\" in the last 72 hours.  No results for input(s): \"CXSURG\" in the last 72 hours.    Intraop Cultures: sent    ASSESSMENT  POD 1 s/p flexor tenosynovitis incision and drainage    PLAN    Weightbearing as tolerated upper extremity   24 hour abx coverage  Deep venous thrombosis prophylaxis - Lovenox 30 mg BID., early mobilization, and pneumatic compression device  PT/OT  Pain Control: IV and PO  Monitor H&H  D/C Plan: Pending infectious disease recommendation.  Patient is okay to be discharge with appropriate antibiotic regimen for home.  Patient will follow-up with Dr. Shah in office in 1 week for repeat evaluation.

## 2024-04-26 LAB
MICROORGANISM SPEC CULT: NORMAL
SPECIMEN DESCRIPTION: NORMAL

## 2024-04-26 PROCEDURE — 2580000003 HC RX 258: Performed by: PHYSICAL THERAPY ASSISTANT

## 2024-04-26 PROCEDURE — 2580000003 HC RX 258: Performed by: INTERNAL MEDICINE

## 2024-04-26 PROCEDURE — 6370000000 HC RX 637 (ALT 250 FOR IP): Performed by: PHYSICAL THERAPY ASSISTANT

## 2024-04-26 PROCEDURE — 1200000000 HC SEMI PRIVATE

## 2024-04-26 PROCEDURE — 6360000002 HC RX W HCPCS: Performed by: PHYSICAL THERAPY ASSISTANT

## 2024-04-26 PROCEDURE — 99232 SBSQ HOSP IP/OBS MODERATE 35: CPT | Performed by: INTERNAL MEDICINE

## 2024-04-26 PROCEDURE — 6360000002 HC RX W HCPCS: Performed by: INTERNAL MEDICINE

## 2024-04-26 RX ADMIN — ENOXAPARIN SODIUM 30 MG: 100 INJECTION SUBCUTANEOUS at 21:00

## 2024-04-26 RX ADMIN — FOLIC ACID 1 MG: 1 TABLET ORAL at 10:16

## 2024-04-26 RX ADMIN — VANCOMYCIN HYDROCHLORIDE 1750 MG: 10 INJECTION, POWDER, LYOPHILIZED, FOR SOLUTION INTRAVENOUS at 05:38

## 2024-04-26 RX ADMIN — ACETAMINOPHEN 650 MG: 325 TABLET ORAL at 14:33

## 2024-04-26 RX ADMIN — ACETAMINOPHEN 650 MG: 325 TABLET ORAL at 05:42

## 2024-04-26 RX ADMIN — PIPERACILLIN AND TAZOBACTAM 3375 MG: 3; .375 INJECTION, POWDER, LYOPHILIZED, FOR SOLUTION INTRAVENOUS; PARENTERAL at 16:17

## 2024-04-26 RX ADMIN — PIPERACILLIN AND TAZOBACTAM 3375 MG: 3; .375 INJECTION, POWDER, LYOPHILIZED, FOR SOLUTION INTRAVENOUS; PARENTERAL at 10:26

## 2024-04-26 RX ADMIN — ACETAMINOPHEN 650 MG: 325 TABLET ORAL at 21:45

## 2024-04-26 RX ADMIN — SODIUM CHLORIDE, PRESERVATIVE FREE 10 ML: 5 INJECTION INTRAVENOUS at 21:45

## 2024-04-26 RX ADMIN — ENOXAPARIN SODIUM 30 MG: 100 INJECTION SUBCUTANEOUS at 10:18

## 2024-04-26 RX ADMIN — PIPERACILLIN AND TAZOBACTAM 3375 MG: 3; .375 INJECTION, POWDER, LYOPHILIZED, FOR SOLUTION INTRAVENOUS; PARENTERAL at 01:08

## 2024-04-26 RX ADMIN — VANCOMYCIN HYDROCHLORIDE 1750 MG: 10 INJECTION, POWDER, LYOPHILIZED, FOR SOLUTION INTRAVENOUS at 18:48

## 2024-04-26 ASSESSMENT — PAIN SCALES - GENERAL
PAINLEVEL_OUTOF10: 6
PAINLEVEL_OUTOF10: 4
PAINLEVEL_OUTOF10: 0
PAINLEVEL_OUTOF10: 3
PAINLEVEL_OUTOF10: 5

## 2024-04-26 ASSESSMENT — PAIN DESCRIPTION - ORIENTATION
ORIENTATION: RIGHT
ORIENTATION: RIGHT

## 2024-04-26 ASSESSMENT — PAIN SCALES - WONG BAKER
WONGBAKER_NUMERICALRESPONSE: NO HURT
WONGBAKER_NUMERICALRESPONSE: NO HURT

## 2024-04-26 ASSESSMENT — PAIN DESCRIPTION - LOCATION
LOCATION: HAND
LOCATION: HAND

## 2024-04-26 ASSESSMENT — PAIN DESCRIPTION - DESCRIPTORS
DESCRIPTORS: ACHING
DESCRIPTORS: DISCOMFORT;THROBBING;SORE

## 2024-04-26 NOTE — PROGRESS NOTES
Pharmacy Consultation Note  (Antibiotic Dosing and Monitoring)    Initial consult date: 04/24/2024  Consulting physician/provider: ashley  Drug: Vancomycin  Indication: SSTI    Age/  Gender Height Weight IBW  Allergy Information   58 y.o./female 170.2 cm (5' 7\") 102.5 kg (225 lb 15.5 oz)     Ideal body weight: 61.6 kg (135 lb 12.9 oz)  Adjusted ideal body weight: 77.5 kg (170 lb 13.5 oz)   Patient has no known allergies.      Renal Function:  Recent Labs     04/23/24  2146 04/25/24  1424   BUN 11  --    CREATININE 0.8 0.8       No intake or output data in the 24 hours ending 04/26/24 1133      Vancomycin Monitoring:  Trough:    Recent Labs     04/25/24  1424   VANCOTROUGH 4.6*     Random:  No results for input(s): \"VANCORANDOM\" in the last 72 hours.      Vancomycin Administration Times:  Recent vancomycin administrations                     vancomycin (VANCOCIN) 1,750 mg in sodium chloride 0.9 % 500 mL IVPB (mg) 1,750 mg New Bag 04/26/24 0538     1,750 mg New Bag 04/25/24 2159    vancomycin (VANCOCIN) 1,250 mg in sodium chloride 0.9 % 250 mL IVPB (mg) 1,250 mg New Bag 04/25/24 0217     1,250 mg New Bag 04/24/24 1338    vancomycin (VANCOCIN) 1,500 mg in sodium chloride 0.9 % 250 mL IVPB (mg) 1,500 mg New Bag 04/24/24 0051               Assessment:  Patient is a 58 y.o. female who has been initiated on vancomycin  Estimated Creatinine Clearance: 94 mL/min (based on SCr of 0.8 mg/dL).  To dose vancomycin, pharmacy will be utilizing [employer] calculation software for goal AUC/SERA 400-600 mg/L-hr (predicted AUC/SERA = 460, Tr =15.4 mcg/mL)  4/25: Trough @ 1424 = 4.6 mcg/mL, AUC/SERA <400 mg/L-hr    Plan:  Vancomycin 1750 mg IV every 12 hours  Trough tomorrow @ 0500  Will continue to monitor renal function   Pharmacy to follow      Timothy Hackett PharmD 4/26/2024 11:37 AM   [phone]    SJW: [phone]

## 2024-04-26 NOTE — PLAN OF CARE
Problem: Discharge Planning  Goal: Discharge to home or other facility with appropriate resources  4/25/2024 2209 by Sam Howell RN  Outcome: Progressing  4/25/2024 1936 by Sam Howell RN  Outcome: Progressing  4/25/2024 1050 by Zoe Garcia RN  Outcome: Progressing     Problem: ABCDS Injury Assessment  Goal: Absence of physical injury  4/25/2024 2209 by Sam Howell RN  Outcome: Progressing  4/25/2024 1936 by Sam Howell RN  Outcome: Progressing  4/25/2024 1050 by Zoe Garcia RN  Outcome: Progressing     Problem: Pain  Goal: Verbalizes/displays adequate comfort level or baseline comfort level  4/25/2024 2209 by Sam Howell RN  Outcome: Progressing  4/25/2024 1936 by Sam Howell RN  Outcome: Progressing  4/25/2024 1050 by Zoe Garcia RN  Outcome: Progressing     Problem: Gastrointestinal - Adult  Goal: Minimal or absence of nausea and vomiting  4/25/2024 2209 by Sam Howell RN  Outcome: Progressing  4/25/2024 1936 by Sam Howell RN  Outcome: Progressing  4/25/2024 1050 by Zoe Garcia RN  Outcome: Progressing     Problem: Safety - Adult  Goal: Free from fall injury  4/25/2024 2209 by Sam Howell RN  Outcome: Progressing  4/25/2024 1936 by Sam Howell RN  Outcome: Progressing  4/25/2024 1050 by Zoe Garcia RN  Outcome: Progressing     Problem: Hematologic - Adult  Goal: Maintains hematologic stability  4/25/2024 2209 by Sam Howell RN  Outcome: Progressing  4/25/2024 1936 by Sam Howell RN  Outcome: Progressing  4/25/2024 1050 by Zoe Garcia RN  Outcome: Progressing

## 2024-04-26 NOTE — PROGRESS NOTES
Admitting Date and Time: 4/23/2024  9:07 PM  Admit Dx: Cellulitis [L03.90]  Cellulitis of finger of right hand [L03.011]  Sepsis due to cellulitis (HCC) [L03.90, A41.9]  Sepsis without acute organ dysfunction, due to unspecified organism (HCC) [A41.9]    Subjective:    finger pain is further improved with less pain  Per RN: No issue    ROS: denies fever, chills, cp, sob, n/v, HA unless stated above.     vancomycin  1,750 mg IntraVENous Q12H    folic acid  1 mg Oral Daily    sodium chloride  1,000 mL IntraVENous Once    sodium chloride flush  10 mL IntraVENous 2 times per day    enoxaparin  30 mg SubCUTAneous BID    piperacillin-tazobactam  3,375 mg IntraVENous Q8H     HYDROmorphone, 0.5 mg, Q3H PRN  sodium chloride flush, 10 mL, PRN  sodium chloride, , PRN  promethazine, 12.5 mg, Q6H PRN   Or  ondansetron, 4 mg, Q6H PRN  polyethylene glycol, 17 g, Daily PRN  acetaminophen, 650 mg, Q6H PRN   Or  acetaminophen, 650 mg, Q6H PRN         Objective:    BP (!) 128/94   Pulse 80   Temp 97.9 °F (36.6 °C) (Oral)   Resp 14   Ht 1.702 m (5' 7\")   Wt 101.3 kg (223 lb 6.4 oz)   SpO2 100%   BMI 34.99 kg/m²   General Appearance: alert and oriented to person, place and time and in no acute distress  Skin: warm and dry  Head: normocephalic and atraumatic  Eyes: pupils equal, round, and reactive to light, extraocular eye movements intact, conjunctivae normal  Neck: neck supple and non tender without mass   Pulmonary/Chest: clear to auscultation bilaterally- no wheezes, rales or rhonchi, normal air movement, no respiratory distress  Cardiovascular: normal rate, normal S1 and S2 and no carotid bruits  Abdomen: soft, non-tender, non-distended, normal bowel sounds, no masses or organomegaly  Extremities: no cyanosis, no clubbing and no  edema in lower extremities however right finger in question is thoroughly wrapped with surgery due to unwrap it therefore my exam deferred.  Previous picture reviewed  Neurologic: no cranial nerve  her wound yesterday cultures will be pending for the rest of the day previous blood cultures are almost 48 hours old and are still negative I explained to the patient that I will be managing her current and discharge antibiotics and not some other specialty.  She understands and jokes around about completing her workup hopefully by tomorrow so she can be switched to oral antibiotics  2.  Rheumatoid arthritis recently on prednisone and methotrexate which she and I agree are likely not to be causational  3. DVT prophylaxis: Lovenox  Full code.  Disposition: Will need complete culture reports before discharge      NOTE: This report was transcribed using voice recognition software. Every effort was made to ensure accuracy; however, inadvertent computerized transcription errors may be present.     Electronically signed by ANG SMITH MD on 4/26/2024 at 8:33 AM

## 2024-04-26 NOTE — CARE COORDINATION
4-26-Cm note: Pending final cx's , pt had I&D of finger from a splinter, she has a [employer], pt is independent, she works so if IV Abx's are required she would prefer to go to the Infusion Center so hopefully she can be on something thats daily.  at the bedside, no needs identified at this time. Electronically signed by Bibiana Granger RN on 4/26/2024 at 2:20 PM

## 2024-04-27 VITALS
SYSTOLIC BLOOD PRESSURE: 149 MMHG | DIASTOLIC BLOOD PRESSURE: 81 MMHG | HEIGHT: 67 IN | WEIGHT: 223.4 LBS | OXYGEN SATURATION: 100 % | TEMPERATURE: 98.3 F | HEART RATE: 106 BPM | RESPIRATION RATE: 18 BRPM | BODY MASS INDEX: 35.06 KG/M2

## 2024-04-27 PROBLEM — A41.9 SEPSIS (HCC): Status: ACTIVE | Noted: 2024-04-27

## 2024-04-27 LAB
ANION GAP SERPL CALCULATED.3IONS-SCNC: 13 MMOL/L (ref 7–16)
BUN SERPL-MCNC: 11 MG/DL (ref 6–20)
CALCIUM SERPL-MCNC: 9 MG/DL (ref 8.6–10.2)
CHLORIDE SERPL-SCNC: 107 MMOL/L (ref 98–107)
CO2 SERPL-SCNC: 19 MMOL/L (ref 22–29)
CREAT SERPL-MCNC: 0.9 MG/DL (ref 0.5–1)
DATE LAST DOSE: ABNORMAL
GFR SERPL CREATININE-BSD FRML MDRD: 77 ML/MIN/1.73M2
GLUCOSE SERPL-MCNC: 92 MG/DL (ref 74–99)
MICROORGANISM SPEC CULT: NO GROWTH
MICROORGANISM/AGENT SPEC: NORMAL
POTASSIUM SERPL-SCNC: 3.6 MMOL/L (ref 3.5–5)
SODIUM SERPL-SCNC: 139 MMOL/L (ref 132–146)
SPECIMEN DESCRIPTION: NORMAL
TME LAST DOSE: ABNORMAL H
VANCOMYCIN DOSE: ABNORMAL MG
VANCOMYCIN TROUGH SERPL-MCNC: 21.4 UG/ML (ref 5–16)

## 2024-04-27 PROCEDURE — 80048 BASIC METABOLIC PNL TOTAL CA: CPT

## 2024-04-27 PROCEDURE — 6370000000 HC RX 637 (ALT 250 FOR IP): Performed by: PHYSICAL THERAPY ASSISTANT

## 2024-04-27 PROCEDURE — 36415 COLL VENOUS BLD VENIPUNCTURE: CPT

## 2024-04-27 PROCEDURE — 6360000002 HC RX W HCPCS: Performed by: PHYSICAL THERAPY ASSISTANT

## 2024-04-27 PROCEDURE — 2580000003 HC RX 258: Performed by: PHYSICAL THERAPY ASSISTANT

## 2024-04-27 PROCEDURE — 99239 HOSP IP/OBS DSCHRG MGMT >30: CPT | Performed by: INTERNAL MEDICINE

## 2024-04-27 PROCEDURE — 80202 ASSAY OF VANCOMYCIN: CPT

## 2024-04-27 RX ORDER — DOXYCYCLINE HYCLATE 100 MG
100 TABLET ORAL 2 TIMES DAILY
Qty: 14 TABLET | Refills: 0 | Status: SHIPPED | OUTPATIENT
Start: 2024-04-27 | End: 2024-05-04

## 2024-04-27 RX ADMIN — SODIUM CHLORIDE, PRESERVATIVE FREE 10 ML: 5 INJECTION INTRAVENOUS at 08:24

## 2024-04-27 RX ADMIN — ENOXAPARIN SODIUM 30 MG: 100 INJECTION SUBCUTANEOUS at 08:24

## 2024-04-27 RX ADMIN — ACETAMINOPHEN 650 MG: 325 TABLET ORAL at 04:47

## 2024-04-27 RX ADMIN — PIPERACILLIN AND TAZOBACTAM 3375 MG: 3; .375 INJECTION, POWDER, LYOPHILIZED, FOR SOLUTION INTRAVENOUS; PARENTERAL at 08:27

## 2024-04-27 RX ADMIN — PIPERACILLIN AND TAZOBACTAM 3375 MG: 3; .375 INJECTION, POWDER, LYOPHILIZED, FOR SOLUTION INTRAVENOUS; PARENTERAL at 00:15

## 2024-04-27 RX ADMIN — FOLIC ACID 1 MG: 1 TABLET ORAL at 08:24

## 2024-04-27 ASSESSMENT — PAIN SCALES - GENERAL: PAINLEVEL_OUTOF10: 4

## 2024-04-27 ASSESSMENT — PAIN DESCRIPTION - LOCATION: LOCATION: HAND

## 2024-04-27 ASSESSMENT — PAIN DESCRIPTION - ORIENTATION: ORIENTATION: RIGHT

## 2024-04-27 ASSESSMENT — PAIN DESCRIPTION - DESCRIPTORS: DESCRIPTORS: DISCOMFORT;THROBBING;SORE

## 2024-04-27 NOTE — CARE COORDINATION
SOCIAL WORK / DISCHARGE PLANNING:  Dc order noted, discussed with Heike RILEY charge. Pt to dc home on po Doxy. No need for Infusion Center now. No other dc needs identified or requested at this time.         Electronically signed by ELAINA Mesa on 4/27/2024 at 10:41 AM

## 2024-04-27 NOTE — PLAN OF CARE
Problem: Discharge Planning  Goal: Discharge to home or other facility with appropriate resources  4/27/2024 0054 by Wayne Pearce RN  Outcome: Progressing  4/26/2024 1528 by Tray Aguilar RN  Outcome: Progressing     Problem: ABCDS Injury Assessment  Goal: Absence of physical injury  4/27/2024 0054 by Wayne Pearce RN  Outcome: Progressing  4/26/2024 1528 by Tray Aguilar RN  Outcome: Progressing     Problem: Pain  Goal: Verbalizes/displays adequate comfort level or baseline comfort level  4/27/2024 0054 by Wayne Pearce RN  Outcome: Progressing  4/26/2024 1528 by Tray Aguilar RN  Outcome: Progressing     Problem: Gastrointestinal - Adult  Goal: Minimal or absence of nausea and vomiting  4/27/2024 0054 by Wayne Pearce RN  Outcome: Progressing  4/26/2024 1528 by Tray Aguilar RN  Outcome: Progressing     Problem: Hematologic - Adult  Goal: Maintains hematologic stability  4/27/2024 0054 by Wayne Pearce RN  Outcome: Progressing  4/26/2024 1528 by Tray Aguilar RN  Outcome: Progressing     Problem: Safety - Adult  Goal: Free from fall injury  4/27/2024 0054 by Wayne Pearce RN  Outcome: Progressing  4/26/2024 1528 by Tray Aguilar RN  Outcome: Progressing

## 2024-04-27 NOTE — DISCHARGE SUMMARY
[facility] Hospitalist       Hospitalist Physician Discharge Summary       Davis Shah DO  [address] [facility] [address]  [phone]    Follow up in 2 week(s)  For suture removal, Repeat XRs and evaluation      Activity level: Slowly increase as tolerated    Diet: ADULT DIET; Regular    Labs: None are pending at the discharge    Condition at discharge: Stable    Dispo: Return to home setting     Patient ID:  Arminda Nelson  [id number]  58 y.o.  1965    Admit date: 4/23/2024    Discharge date and time:  4/27/2024  8:11 AM    Admission Diagnoses: Principal Problem:    Cellulitis  Resolved Problems:    * No resolved hospital problems. *      Discharge Diagnoses: Principal Problem:    Cellulitis  Resolved Problems:    * No resolved hospital problems. *      Consults:  IP CONSULT TO ORTHOPEDIC SURGERY  PHARMACY TO DOSE VANCOMYCIN    Procedures: None significant except if described in hospital course.    Hospital Course: History of present illness from History and Physical:  This is a 58 y.o. female  has a past medical history of Arthritis and Carpal tunnel syndrome, left. presented with Right hand pain and swelling  for last few days prior to arrival to the hospital.  Pain and swelling and redness started at right index finger but no spreading to entire hand and forearm.   The patient was seen and examined at bedside, appears alert and awake with no acute distress and is able to answer simple  questions. On direct questioning, patient denied any  resting ongoing chest pain, resting SOB, hemoptysis, productive cough, fever, ongoing palpitation, active abdominal pain, hematemesis, rectal bleeding, abram, hematuria, any other  and GI complaints, and any new focal neuro deficits.     1. Right Hand Cellulitis caused sepsis.  She has done well on Zosyn and Vanco IV.  Orthopedics did a flexor tenosynovitis incision and drainage on 4/24.  Now all cultures including wound culture blood  PROVIDED HISTORY: Reason for exam:->right index finger wound, concern for nec fasc FINDINGS: Diffuse soft tissue swelling of the index finger and hand is identified.  No soft tissue gas is seen.  No radiopaque foreign body visualized.  No osseous destruction is noted.  No acute fracture.  Joint alignment is intact.     Index finger and hand diffuse soft tissue swelling.  No soft tissue gas identified.  No acute osseous abnormality.         Patient Instructions:   Current Discharge Medication List        START taking these medications    Details   oxyCODONE-acetaminophen (PERCOCET) 5-325 MG per tablet Take 1 tablet by mouth every 6 hours as needed for Pain for up to 7 days. Intended supply: 7 days. Take lowest dose possible to manage pain Max Daily Amount: 4 tablets  Qty: 28 tablet, Refills: 0    Comments: Reduce doses taken as pain becomes manageable  Associated Diagnoses: Acute post-operative pain           CONTINUE these medications which have NOT CHANGED    Details   folic acid (FOLVITE) 1 MG tablet Take 1 tablet by mouth daily      methotrexate (RHEUMATREX) 2.5 MG chemo tablet Take 6 tablets by mouth once a week First dose was taken Friday 4/19               Note that more than 30 minutes was spent in preparing discharge papers, discussing discharge with patient, medication review, etc.    NOTE: This report was transcribed using voice recognition software. Every effort was made to ensure accuracy; however, inadvertent computerized transcription errors may be present.     Signed:  Electronically signed by ANG SMITH MD on 4/27/2024 at 8:11 AM

## 2024-04-27 NOTE — PLAN OF CARE
Problem: Discharge Planning  Goal: Discharge to home or other facility with appropriate resources  4/27/2024 0850 by Zoe Garcia RN  Outcome: Progressing  4/27/2024 0054 by Wayne Pearce RN  Outcome: Progressing     Problem: ABCDS Injury Assessment  Goal: Absence of physical injury  4/27/2024 0850 by Zoe Garcia RN  Outcome: Progressing  4/27/2024 0054 by Wayne Pearce RN  Outcome: Progressing     Problem: Pain  Goal: Verbalizes/displays adequate comfort level or baseline comfort level  4/27/2024 0850 by Zoe Garcia RN  Outcome: Progressing  4/27/2024 0054 by Wayne Pearce RN  Outcome: Progressing     Problem: Gastrointestinal - Adult  Goal: Minimal or absence of nausea and vomiting  4/27/2024 0850 by Zoe Garcia RN  Outcome: Progressing  4/27/2024 0054 by Wayne Pearce RN  Outcome: Progressing     Problem: Hematologic - Adult  Goal: Maintains hematologic stability  4/27/2024 0850 by Zoe Garcia RN  Outcome: Progressing  4/27/2024 0054 by Wayne Pearce RN  Outcome: Progressing     Problem: Safety - Adult  Goal: Free from fall injury  4/27/2024 0850 by Zoe Garcia RN  Outcome: Progressing  4/27/2024 0054 by Wayne Pearce RN  Outcome: Progressing

## 2024-04-28 LAB
MICROORGANISM SPEC CULT: NORMAL
MICROORGANISM SPEC CULT: NORMAL
SERVICE CMNT-IMP: NORMAL
SERVICE CMNT-IMP: NORMAL
SPECIMEN DESCRIPTION: NORMAL
SPECIMEN DESCRIPTION: NORMAL

## 2024-04-29 ENCOUNTER — TELEPHONE (OUTPATIENT)
Dept: ADMINISTRATIVE | Age: 59
End: 2024-04-29

## 2024-04-29 LAB
MICROORGANISM SPEC CULT: NORMAL
SERVICE CMNT-IMP: NORMAL
SERVICE CMNT-IMP: NORMAL
SPECIMEN DESCRIPTION: NORMAL

## 2024-04-29 NOTE — TELEPHONE ENCOUNTER
Pt will need a HFU STJ  4/23-27 rt index finger, has sutures, has questions on wound care.  Please call her back at [phone]

## 2024-04-29 NOTE — PROGRESS NOTES
CLINICAL PHARMACY NOTE: MEDS TO BEDS    Total # of Prescriptions Filled: 1   The following medications were delivered to the patient:  Oxycodone 5-325 mg    Additional Documentation:   Gave to Heike

## 2024-05-07 ENCOUNTER — OFFICE VISIT (OUTPATIENT)
Dept: ORTHOPEDIC SURGERY | Age: 59
End: 2024-05-07

## 2024-05-07 VITALS — BODY MASS INDEX: 35 KG/M2 | TEMPERATURE: 98 F | HEIGHT: 67 IN | WEIGHT: 223 LBS

## 2024-05-07 DIAGNOSIS — M65.141 SUPPURATIVE TENOSYNOVITIS OF FLEXOR TENDON OF RIGHT HAND: Primary | ICD-10-CM

## 2024-05-07 DIAGNOSIS — L03.011 CELLULITIS OF FINGER OF RIGHT HAND: ICD-10-CM

## 2024-05-07 PROCEDURE — 99024 POSTOP FOLLOW-UP VISIT: CPT | Performed by: ORTHOPAEDIC SURGERY

## 2024-05-07 RX ORDER — METHOTREXATE 2.5 MG/1
TABLET ORAL
COMMUNITY
Start: 2024-04-18

## 2024-05-07 NOTE — PROGRESS NOTES
rAminda Nelson is here for followup after right index finger I&D surgery. Pain is controlled with current analgesics.  Medication(s) being used: Oxycodone.. The patient notes improvement in the following symptoms:pain.  The patient denies fever, wound drainage, increasing redness, pus, increasing pain, increasing swelling. Post op problems reported: none.         Objective:         General :    alert, appears stated age, and cooperative   Sutures:   Sutures out.   Incision:  healing well, no significant drainage, no dehiscence, no significant erythema   Tenderness:  none   Flexion ROM:  diminished range of motion   Extension ROM:  diminished range of motion   Effusion:  mild         Assessment:        Status post I&D right index finger surgery. Doing well postoperatively.        Plan:     Sutures removed today.  HEP  Can get wet in the shower, do not submerge  Start on ROM of finger  OT referral  Follow up 1 month

## 2024-05-10 ENCOUNTER — EVALUATION (OUTPATIENT)
Dept: OCCUPATIONAL THERAPY | Age: 59
End: 2024-05-10

## 2024-05-10 DIAGNOSIS — L03.011 CELLULITIS OF FINGER OF RIGHT HAND: Primary | ICD-10-CM

## 2024-05-10 NOTE — PROGRESS NOTES
OCCUPATIONAL THERAPY INITIAL EVALUATION    [facility] OCCUPATIONAL THERAPY   [place] [address]  Dept: [phone]  Loc: [phone]   [facility] OT Fax: [phone]    Date:  5/10/2024  Initial Evaluation Date: 5-10-24     Evaluating Therapist: Cheliat Urbina OT    Patient Name:  Arminda Nelson    :  1965    Restrictions/Precautions:  ROM as tolerated, Low fall risk  Diagnosis:  L03.011 (ICD-10-CM) - Cellulitis of finger of right hand       Date of Surgery/Injury: 24 R index finger I&D    Insurance/Certification information:  [facility]  Plan of care signed (Y/N): N  Visit# / total visits:  visits    Referring Practitioner:  Dr Wm EZE henriquez  Specific Practitioner Orders: OT evaluate and treat    Assessment of current deficits   [] Functional mobility  [x] ADLs  [x] Strength   [] Cognition   [] Functional transfers   [x] IADLs  [] Safety Awareness  [] Endurance   [x] Fine Motor Coordination  [] Balance  [] Vision/perception  [x] Sensation    [] Gross Motor Coordination [x] ROM  [x] Pain   [x] Edema    [x] Scar Adhesion/Skin Integrity     OT PLAN OF CARE   OT POC based on physician orders, patient diagnosis and results of clinical assessment    Frequency/Duration: 2-3x/ week x 12 visits    Specific OT Treatment to include:   [x] Instruction in HEP                   Modalities:  [x] Therapeutic Exercise        [x] Ultrasound               [] Electrical Stimulation/Attended  [x] PROM/Stretching                    [x] Fluidotherapy          [x]  Paraffin                   [x] AAROM  [x] AROM                 [] Iontophoresis:   [x] Tendon Glides                                               [] Neuromuscular Re-Ed            [] ADL/IADL re-training    [x] Therapeutic Activity       [x] Pain Management with/without modalities PRN                 [x] Manual Therapy                      [x] Splinting                      [x]

## 2024-05-13 ENCOUNTER — TREATMENT (OUTPATIENT)
Dept: OCCUPATIONAL THERAPY | Age: 59
End: 2024-05-13
Payer: COMMERCIAL

## 2024-05-13 DIAGNOSIS — L03.011 CELLULITIS OF FINGER OF RIGHT HAND: Primary | ICD-10-CM

## 2024-05-13 PROCEDURE — 97140 MANUAL THERAPY 1/> REGIONS: CPT | Performed by: OCCUPATIONAL THERAPIST

## 2024-05-13 PROCEDURE — 97530 THERAPEUTIC ACTIVITIES: CPT | Performed by: OCCUPATIONAL THERAPIST

## 2024-05-13 PROCEDURE — 97110 THERAPEUTIC EXERCISES: CPT | Performed by: OCCUPATIONAL THERAPIST

## 2024-05-13 NOTE — PROGRESS NOTES
OCCUPATIONAL THERAPY DAILY NOTE  [facility] PHYSICIANS [place] SPECIALTY [facility] OCCUPATIONAL THERAPY   [address] [place] [address]  Dept: [phone]  Loc: [phone]   [facility] OT Fax: [phone]      Date:  2024  Initial Evaluation Date: 5-10-24     Evaluating Therapist: Chelita Urbina OT    Patient Name:  Arminda Nelson    :  1965    Restrictions/Precautions:  ROM as tolerated, Low fall risk  Diagnosis:  L03.011 (ICD-10-CM) - Cellulitis of finger of right hand                                                              Date of Surgery/Injury: 24 R index finger I&D     Insurance/Certification information:  [facility] combined PT/OT/SP  Plan of care signed (Y/N): N  Visit# / total visits: 2  visits     Referring Practitioner:  Dr Wm EZE henriquez  Specific Practitioner Orders: OT evaluate and treat     Assessment of current deficits   [] Functional mobility             [x] ADLs           [x] Strength                  [] Cognition   [] Functional transfers           [x] IADLs          [] Safety Awareness  [] Endurance   [x] Fine Motor Coordination    [] Balance      [] Vision/perception    [x] Sensation     [] Gross Motor Coordination [x] ROM           [x] Pain                        [x] Edema          [x] Scar Adhesion/Skin Integrity      OT PLAN OF CARE   OT POC based on physician orders, patient diagnosis and results of clinical assessment     Frequency/Duration: 2-3x/ week x 12 visits     Specific OT Treatment to include:   [x] Instruction in HEP                   Modalities:  [x] Therapeutic Exercise                 [x] Ultrasound               [] Electrical Stimulation/Attended  [x] PROM/Stretching                    [x] Fluidotherapy          [x]  Paraffin                   [x] AAROM  [x] AROM                 [] Iontophoresis:   [x] Tendon Glides                                               [] Neuromuscular Re-Ed            [] ADL/IADL re-training

## 2024-05-15 ENCOUNTER — TREATMENT (OUTPATIENT)
Dept: OCCUPATIONAL THERAPY | Age: 59
End: 2024-05-15
Payer: COMMERCIAL

## 2024-05-15 DIAGNOSIS — L03.011 CELLULITIS OF FINGER OF RIGHT HAND: Primary | ICD-10-CM

## 2024-05-15 PROCEDURE — 97022 WHIRLPOOL THERAPY: CPT | Performed by: OCCUPATIONAL THERAPIST

## 2024-05-15 PROCEDURE — 97140 MANUAL THERAPY 1/> REGIONS: CPT | Performed by: OCCUPATIONAL THERAPIST

## 2024-05-15 PROCEDURE — 97110 THERAPEUTIC EXERCISES: CPT | Performed by: OCCUPATIONAL THERAPIST

## 2024-05-15 NOTE — PROGRESS NOTES
OCCUPATIONAL THERAPY DAILY NOTE  [facility] PHYSICIANS [place] SPECIALTY [facility] OCCUPATIONAL THERAPY   [address] [place] [address]  Dept: [phone]  Loc: [phone]   [facility] OT Fax: [phone]      Date:  5/15/2024  Initial Evaluation Date: 5-10-24     Evaluating Therapist: Chelita Urbina OT    Patient Name:  Arminda Nelson    :  1965    Restrictions/Precautions:  ROM as tolerated, Low fall risk  Diagnosis:  L03.011 (ICD-10-CM) - Cellulitis of finger of right hand                                                              Date of Surgery/Injury: 24 R index finger I&D     Insurance/Certification information:  [facility] combined PT/OT/SP  Plan of care signed (Y/N): N  Visit# / total visits: 3 / 12 visits     Referring Practitioner:  Dr Wm EZE henriquez  Specific Practitioner Orders: OT evaluate and treat     Assessment of current deficits   [] Functional mobility             [x] ADLs           [x] Strength                  [] Cognition   [] Functional transfers           [x] IADLs          [] Safety Awareness  [] Endurance   [x] Fine Motor Coordination    [] Balance      [] Vision/perception    [x] Sensation     [] Gross Motor Coordination [x] ROM           [x] Pain                        [x] Edema          [x] Scar Adhesion/Skin Integrity      OT PLAN OF CARE   OT POC based on physician orders, patient diagnosis and results of clinical assessment     Frequency/Duration: 2-3x/ week x 12 visits     Specific OT Treatment to include:   [x] Instruction in HEP                   Modalities:  [x] Therapeutic Exercise                 [x] Ultrasound               [] Electrical Stimulation/Attended  [x] PROM/Stretching                    [x] Fluidotherapy          [x]  Paraffin                   [x] AAROM  [x] AROM                 [] Iontophoresis:   [x] Tendon Glides                                               [] Neuromuscular Re-Ed            [] ADL/IADL re-training

## 2024-05-17 ENCOUNTER — TREATMENT (OUTPATIENT)
Dept: OCCUPATIONAL THERAPY | Age: 59
End: 2024-05-17

## 2024-05-17 DIAGNOSIS — L03.011 CELLULITIS OF FINGER OF RIGHT HAND: Primary | ICD-10-CM

## 2024-05-17 NOTE — PROGRESS NOTES
PROM    Subjective: Pt reports her finger is still painful, stiff and with discoloration (bluish/ purple)  Objective:    Updated POC to be completed by visit 6.    INTERVENTION: COMPLETED: SPECIFICS/COMMENTS:   Modality:     Fluidotherapy R  hand  - 10min at tx start to decrease joint stiffness and hypersensitivity   FES for IF flexion x -PPR 2-14 to facilitate flexor excursion in the Index/ slight improvement with place and hold using stim- 10 min   US x R IF  - 5 min to aid in edema mgmt    AROM:     Hand AROM X  X      X   - Place and hold at PIP/ challenging  - Blocking AROM ex at PIP and DIP/ challenging  - Palm on foam block with isolated PIP flexion as tolerated- 10x  - opposition with xsmall blocks using thumb to index  - opposition around tubing to encourage DIP/PIP flexion        AAROM:     Digital AROM x - IF AAROM for flexion/ extension        PROM/Stretching:     Digital PROM X   - IF PROM at the PIP and at DIP  - coban stretch in composite flexion as tolerated w/ MH        Scar Mass/Edema Control:      X  x - retrograde massage to IF  - scar massage to IF   Edema control x - ^ use of gauze tubing, coban and finger sleeve without metal stay   Strengthening:               Other:     Skin status      - skin is peeling to above the PIP of the IF/ no open wounds noted  - all old skin has peeled from the IF    x - digit exhibits pinkish blue cast     Assessment/Comments: Tx continued with gentle massage, stretches and ROM. FES added to ^ tendon excursion in the IF. Slight improvement in active PIP movement noted. Swelling is decreasing nicely. Will cotninue to advance activity as tolerated.   -Rehab Potential: Good   -Patient Response to Treatment: Pt is motivated for recovery.    Patient. Education:  [x] Plans/Goals, Risks/Benefits discussed  [] Home exercise program  Method of Education: [x] Verbal  [x] Demo  [] Written  Comprehension of Education:  [x] Verbalizes understanding.  [x] Demonstrates

## 2024-05-20 ENCOUNTER — TREATMENT (OUTPATIENT)
Dept: OCCUPATIONAL THERAPY | Age: 59
End: 2024-05-20
Payer: COMMERCIAL

## 2024-05-20 DIAGNOSIS — L03.011 CELLULITIS OF FINGER OF RIGHT HAND: Primary | ICD-10-CM

## 2024-05-20 PROCEDURE — 97110 THERAPEUTIC EXERCISES: CPT | Performed by: OCCUPATIONAL THERAPIST

## 2024-05-20 PROCEDURE — 97022 WHIRLPOOL THERAPY: CPT | Performed by: OCCUPATIONAL THERAPIST

## 2024-05-20 PROCEDURE — 97140 MANUAL THERAPY 1/> REGIONS: CPT | Performed by: OCCUPATIONAL THERAPIST

## 2024-05-20 PROCEDURE — 97760 ORTHOTIC MGMT&TRAING 1ST ENC: CPT | Performed by: OCCUPATIONAL THERAPIST

## 2024-05-20 NOTE — PROGRESS NOTES
OCCUPATIONAL THERAPY DAILY NOTE  [facility] PHYSICIANS [place] SPECIALTY [facility] OCCUPATIONAL THERAPY   [address] [place] [address]  Dept: [phone]  Loc: [phone]   [facility] OT Fax: [phone]      Date:  2024  Initial Evaluation Date: 5-10-24     Evaluating Therapist: Chelita Urbina OT    Patient Name:  Arminda Nelson    :  1965    Restrictions/Precautions:  ROM as tolerated, Low fall risk  Diagnosis:  L03.011 (ICD-10-CM) - Cellulitis of finger of right hand                                                              Date of Surgery/Injury: 24 R index finger I&D     Insurance/Certification information:  [facility] [employer]- 24 combined PT/OT/SP  Plan of care signed (Y/N): N  Visit# / total visits:  visits     Referring Practitioner:  Dr Wm EZE henriquez  Specific Practitioner Orders: OT evaluate and treat     Assessment of current deficits   [] Functional mobility             [x] ADLs           [x] Strength                  [] Cognition   [] Functional transfers           [x] IADLs          [] Safety Awareness  [] Endurance   [x] Fine Motor Coordination    [] Balance      [] Vision/perception    [x] Sensation     [] Gross Motor Coordination [x] ROM           [x] Pain                        [x] Edema          [x] Scar Adhesion/Skin Integrity      OT PLAN OF CARE   OT POC based on physician orders, patient diagnosis and results of clinical assessment     Frequency/Duration: 2-3x/ week x 12 visits     Specific OT Treatment to include:   [x] Instruction in HEP                   Modalities:  [x] Therapeutic Exercise                 [x] Ultrasound               [] Electrical Stimulation/Attended  [x] PROM/Stretching                    [x] Fluidotherapy          [x]  Paraffin                   [x] AAROM  [x] AROM                 [] Iontophoresis:   [x] Tendon Glides                                               [] Neuromuscular Re-Ed            [] ADL/IADL re-training

## 2024-05-29 ENCOUNTER — TREATMENT (OUTPATIENT)
Dept: OCCUPATIONAL THERAPY | Age: 59
End: 2024-05-29
Payer: COMMERCIAL

## 2024-05-29 DIAGNOSIS — L03.011 CELLULITIS OF FINGER OF RIGHT HAND: Primary | ICD-10-CM

## 2024-05-29 PROCEDURE — 97530 THERAPEUTIC ACTIVITIES: CPT | Performed by: OCCUPATIONAL THERAPIST

## 2024-05-29 PROCEDURE — 97140 MANUAL THERAPY 1/> REGIONS: CPT | Performed by: OCCUPATIONAL THERAPIST

## 2024-05-29 PROCEDURE — 97110 THERAPEUTIC EXERCISES: CPT | Performed by: OCCUPATIONAL THERAPIST

## 2024-05-29 NOTE — PROGRESS NOTES
OCCUPATIONAL THERAPY DAILY NOTE/ STATUS UPDATE  [facility] PHYSICIANS [place] SPECIALTY CARE [facility] OCCUPATIONAL THERAPY   [address] [address]  Dept: [phone]  Loc: [phone]   [facility] OT Fax: [phone]      Date:  2024  Initial Evaluation Date: 5-10-24     Evaluating Therapist: Chelita Urbina OT    Patient Name:  Arminda Nelson    :  1965    Restrictions/Precautions:  ROM as tolerated, Low fall risk  Diagnosis:  L03.011 (ICD-10-CM) - Cellulitis of finger of right hand                                                              Date of Surgery/Injury: 24 R index finger I&D     Insurance/Certification information:  [facility] combined PT/OT/SP  Plan of care signed (Y/N): N  Visit# / total visits:  visits     Referring Practitioner:  Dr Wm EZE henriquez  Specific Practitioner Orders: OT evaluate and treat     Assessment of current deficits   [] Functional mobility             [x] ADLs           [x] Strength                  [] Cognition   [] Functional transfers           [x] IADLs          [] Safety Awareness  [] Endurance   [x] Fine Motor Coordination    [] Balance      [] Vision/perception    [x] Sensation     [] Gross Motor Coordination [x] ROM           [x] Pain                        [x] Edema          [x] Scar Adhesion/Skin Integrity      OT PLAN OF CARE   OT POC based on physician orders, patient diagnosis and results of clinical assessment     Frequency/Duration: 2-3x/ week x 12 visits     Specific OT Treatment to include:   [x] Instruction in HEP                   Modalities:  [x] Therapeutic Exercise                 [x] Ultrasound               [] Electrical Stimulation/Attended  [x] PROM/Stretching                    [x] Fluidotherapy          [x]  Paraffin                   [x] AAROM  [x] AROM                 [] Iontophoresis:   [x] Tendon Glides                                               [] Neuromuscular Re-Ed            []

## 2024-05-31 ENCOUNTER — TREATMENT (OUTPATIENT)
Dept: OCCUPATIONAL THERAPY | Age: 59
End: 2024-05-31
Payer: COMMERCIAL

## 2024-05-31 DIAGNOSIS — L03.011 CELLULITIS OF FINGER OF RIGHT HAND: Primary | ICD-10-CM

## 2024-05-31 PROCEDURE — 97110 THERAPEUTIC EXERCISES: CPT | Performed by: OCCUPATIONAL THERAPIST

## 2024-05-31 PROCEDURE — 97022 WHIRLPOOL THERAPY: CPT | Performed by: OCCUPATIONAL THERAPIST

## 2024-05-31 PROCEDURE — 97140 MANUAL THERAPY 1/> REGIONS: CPT | Performed by: OCCUPATIONAL THERAPIST

## 2024-05-31 NOTE — PROGRESS NOTES
stretch (^ use to all night or as tolerated)- also to be used as target for AROM in flexion  - adjusted 5/29 to ^ flexion    x - digit exhibits a lessened blue cast and ^ more normal coloration     Assessment/Comments: Tx focus on ROM and functional IF use. PROM is getting better in the IF. AROM is starting to improve at the PIP with mild FDS tendon pull through. Distal DIP ROM remains limited actively.        -Rehab Potential: Good   -Patient Response to Treatment: Pt is please her finger is looking better and moving some.     Patient. Education:  [x] Plans/Goals, Risks/Benefits discussed  [] Home exercise program  Method of Education: [x] Verbal  [x] Demo  [] Written  Comprehension of Education:  [x] Verbalizes understanding.  [x] Demonstrates understanding.  [] Needs Review.  [] Demonstrates/verbalizes understanding of HEP/Ed previously given.      Time In:0900           Time Out: 1000       CODE  Minutes  Units   67890 Fluidotherapy 10 1   99974 Paraffin     07799 Ultrasound     60014 Electrical Stim - Attended     42659 Iontophoresis     72481 Therapeutic Ex 30 2   91868 Therapeutic Activity     47177 Neuromuscular Re-Ed     81091 Manual Therapy 20 1   69233 ADL/COMP Tech Train     19473 Orthotic Management/Training      Other: MH                 Total  60 4       Plan: OT 2-3x/week for 12 sessions    [x]  Continue Plan of care with focus on ROM, stretches, edema control,  tendon glides, pain mgmt and progressive functional activity/ strengthening.: Treatment covered based on POC and graduated to patient's progress. Pt education continues at each visit to obtain maximum benefits from skilled OT intervention.  []  Alter Plan of care:   []  Discharge:      Chelita Urbina OT /RISHI  [id number]

## 2024-06-04 ENCOUNTER — TREATMENT (OUTPATIENT)
Dept: OCCUPATIONAL THERAPY | Age: 59
End: 2024-06-04
Payer: COMMERCIAL

## 2024-06-04 DIAGNOSIS — L03.011 CELLULITIS OF FINGER OF RIGHT HAND: Primary | ICD-10-CM

## 2024-06-04 PROCEDURE — 97110 THERAPEUTIC EXERCISES: CPT | Performed by: OCCUPATIONAL THERAPIST

## 2024-06-04 PROCEDURE — 97140 MANUAL THERAPY 1/> REGIONS: CPT | Performed by: OCCUPATIONAL THERAPIST

## 2024-06-04 PROCEDURE — 97022 WHIRLPOOL THERAPY: CPT | Performed by: OCCUPATIONAL THERAPIST

## 2024-06-04 NOTE — PROGRESS NOTES
the RIF/ hand in order to complete typing with ^ ease. (Goal met at 22 sec [place] testing)  6) Patient to demonstrate decreased guarding of their affected extremity from 95% to 20% or less. (Slight improvement- 80%)  7) Patient will demonstrate a non-tender/non-adherent scar. (Goal met)  8) Patient will decrease QuickDASH score to 20% or less for increased participation in daily functional activities. (Goal met at 20% disability from 40.9%)       TODAY'S TREATMENT     Pain Level: No pain is reported at Tx start  Subjective: Pt is concerned she can't bend the finger actively    Objective:    Updated POC to be completed by visit 12.    INTERVENTION: COMPLETED: SPECIFICS/COMMENTS:   Modality:     Fluidotherapy R  hand with AROM x - 10 min at tx start to decrease joint stiffness and hypersensitivity   FES for IF flexion  -PPR 2-14 to facilitate flexor excursion in the Index/ slight improvement with place and hold using stim- 10 min   US x R IF x - 5 min to aid in volar scar mgmt    AROM:     Hand AROM X  X  x         - Place and hold at PIP and at DIP  - Blocking AROM ex at PIP and DIP  - Fingertip curls with tubing to block MCPs  - Palm on ball with composite flexion - 10x  - opposition with xsmall blocks using thumb to index  - opposition around tubing to encourage DIP/PIP flexion  - flicking pom poms using index finger        AAROM:     Digital AROM x - IF AAROM for flexion/ extension        PROM/Stretching:     Digital PROM X  X  x - IF PROM at the PIP and at DIP  - coban stretch in composite flexion as tolerated w/ MH  - flexion glove stretches for IF only- start at 15 min- 3x/ day    x - gentle joint mobilization at DIP and PIP of IF/ tolerated well        Scar Mass/Edema Control:      X  x - retrograde massage to IF  - scar massage to IF   Edema control x - ^ use of coban and finger sleeve without metal stay   Strengthening:               Other:     Skin status  - all old skin has peeled from the IF   Splint

## 2024-06-06 ENCOUNTER — TREATMENT (OUTPATIENT)
Dept: OCCUPATIONAL THERAPY | Age: 59
End: 2024-06-06
Payer: COMMERCIAL

## 2024-06-06 DIAGNOSIS — L03.011 CELLULITIS OF FINGER OF RIGHT HAND: Primary | ICD-10-CM

## 2024-06-06 PROCEDURE — 97110 THERAPEUTIC EXERCISES: CPT | Performed by: OCCUPATIONAL THERAPIST

## 2024-06-06 PROCEDURE — 97140 MANUAL THERAPY 1/> REGIONS: CPT | Performed by: OCCUPATIONAL THERAPIST

## 2024-06-06 PROCEDURE — 97022 WHIRLPOOL THERAPY: CPT | Performed by: OCCUPATIONAL THERAPIST

## 2024-06-06 NOTE — PROGRESS NOTES
OCCUPATIONAL THERAPY DAILY NOTE  [facility] PHYSICIANS [place] SPECIALTY [facility] OCCUPATIONAL THERAPY   [address] [address]  Dept: [phone]  Loc: [phone]   [facility] OT Fax: [phone]      Date:  2024  Initial Evaluation Date: 5-10-24     Evaluating Therapist: Chelita Urbina OT    Patient Name:  Arminda Nelson    :  1965    Restrictions/Precautions:  ROM as tolerated, Low fall risk  Diagnosis:  L03.011 (ICD-10-CM) - Cellulitis of finger of right hand                                                              Date of Surgery/Injury: 24 R index finger I&D     Insurance/Certification information:  [facility] [employer]- 24 combined PT/OT/SP  Plan of care signed (Y/N): N  Visit# / total visits:  visits     Referring Practitioner:  Dr Wm EZE Shah  Specific Practitioner Orders: OT evaluate and treat     Assessment of current deficits   [] Functional mobility             [x] ADLs           [x] Strength                  [] Cognition   [] Functional transfers           [x] IADLs          [] Safety Awareness  [] Endurance   [x] Fine Motor Coordination    [] Balance      [] Vision/perception    [x] Sensation     [] Gross Motor Coordination [x] ROM           [x] Pain                        [x] Edema          [x] Scar Adhesion/Skin Integrity      OT PLAN OF CARE   OT POC based on physician orders, patient diagnosis and results of clinical assessment     Frequency/Duration: 2-3x/ week x 12 visits     Specific OT Treatment to include:   [x] Instruction in HEP                   Modalities:  [x] Therapeutic Exercise                 [x] Ultrasound               [] Electrical Stimulation/Attended  [x] PROM/Stretching                    [x] Fluidotherapy          [x]  Paraffin                   [x] AAROM  [x] AROM                 [] Iontophoresis:   [x] Tendon Glides                                               [] Neuromuscular Re-Ed            [] ADL/IADL re-training

## 2024-06-11 ENCOUNTER — OFFICE VISIT (OUTPATIENT)
Dept: ORTHOPEDIC SURGERY | Age: 59
End: 2024-06-11
Payer: COMMERCIAL

## 2024-06-11 ENCOUNTER — TREATMENT (OUTPATIENT)
Dept: OCCUPATIONAL THERAPY | Age: 59
End: 2024-06-11
Payer: COMMERCIAL

## 2024-06-11 VITALS — TEMPERATURE: 98 F | WEIGHT: 223 LBS | HEIGHT: 67 IN | BODY MASS INDEX: 35 KG/M2

## 2024-06-11 DIAGNOSIS — M65.141 SUPPURATIVE TENOSYNOVITIS OF FLEXOR TENDON OF RIGHT HAND: Primary | ICD-10-CM

## 2024-06-11 DIAGNOSIS — L03.011 CELLULITIS OF FINGER OF RIGHT HAND: Primary | ICD-10-CM

## 2024-06-11 PROCEDURE — 97110 THERAPEUTIC EXERCISES: CPT | Performed by: OCCUPATIONAL THERAPIST

## 2024-06-11 PROCEDURE — 97140 MANUAL THERAPY 1/> REGIONS: CPT | Performed by: OCCUPATIONAL THERAPIST

## 2024-06-11 PROCEDURE — 99213 OFFICE O/P EST LOW 20 MIN: CPT | Performed by: ORTHOPAEDIC SURGERY

## 2024-06-11 PROCEDURE — 97022 WHIRLPOOL THERAPY: CPT | Performed by: OCCUPATIONAL THERAPIST

## 2024-06-11 NOTE — PROGRESS NOTES
Arminda Nelson is here for followup after right index finger I&D surgery. She has been in Ot for her finger. She stated she is doing better but still has trouble with her bend.        Objective:         General :    alert, appears stated age, and cooperative   Sutures:   Sutures out.   Incision:  healing well, no significant drainage, no dehiscence, no significant erythema   Tenderness:  none   Flexion ROM:  diminished range of motion 40 DIP MCP 80   Extension ROM:  diminished range of motion   Effusion:  mild         Assessment:        Status post I&D right index finger surgery. Doing well postoperatively.        Plan:     Continue OT  Follow up 1 month

## 2024-06-11 NOTE — PROGRESS NOTES
OCCUPATIONAL THERAPY DAILY NOTE  [facility] PHYSICIANS [place] SPECIALTY [facility] OCCUPATIONAL THERAPY   [address] [address]  Dept: [phone]  Loc: [phone]   [facility] OT Fax: [phone]      Date:  2024  Initial Evaluation Date: 5-10-24     Evaluating Therapist: Chelita Urbina OT    Patient Name:  Arminda Nelson    :  1965    Restrictions/Precautions:  ROM as tolerated, Low fall risk  Diagnosis:  L03.011 (ICD-10-CM) - Cellulitis of finger of right hand                                                              Date of Surgery/Injury: 24 R index finger I&D     Insurance/Certification information:  [facility] combined PT/OT/SP  Plan of care signed (Y/N): N  Visit# / total visits:  visits     Referring Practitioner:  Dr Wm EZE Shah  Specific Practitioner Orders: OT evaluate and treat     Assessment of current deficits   [] Functional mobility             [x] ADLs           [x] Strength                  [] Cognition   [] Functional transfers           [x] IADLs          [] Safety Awareness  [] Endurance   [x] Fine Motor Coordination    [] Balance      [] Vision/perception    [x] Sensation     [] Gross Motor Coordination [x] ROM           [x] Pain                        [x] Edema          [x] Scar Adhesion/Skin Integrity      OT PLAN OF CARE   OT POC based on physician orders, patient diagnosis and results of clinical assessment     Frequency/Duration: 2-3x/ week x 12 visits     Specific OT Treatment to include:   [x] Instruction in HEP                   Modalities:  [x] Therapeutic Exercise                 [x] Ultrasound               [] Electrical Stimulation/Attended  [x] PROM/Stretching                    [x] Fluidotherapy          [x]  Paraffin                   [x] AAROM  [x] AROM                 [] Iontophoresis:   [x] Tendon Glides                                               [] Neuromuscular Re-Ed            [] ADL/IADL re-training

## 2024-06-13 ENCOUNTER — TREATMENT (OUTPATIENT)
Dept: OCCUPATIONAL THERAPY | Age: 59
End: 2024-06-13
Payer: COMMERCIAL

## 2024-06-13 DIAGNOSIS — L03.011 CELLULITIS OF FINGER OF RIGHT HAND: Primary | ICD-10-CM

## 2024-06-13 PROCEDURE — 97022 WHIRLPOOL THERAPY: CPT | Performed by: OCCUPATIONAL THERAPY ASSISTANT

## 2024-06-13 PROCEDURE — 97110 THERAPEUTIC EXERCISES: CPT | Performed by: OCCUPATIONAL THERAPY ASSISTANT

## 2024-06-13 PROCEDURE — 97530 THERAPEUTIC ACTIVITIES: CPT | Performed by: OCCUPATIONAL THERAPY ASSISTANT

## 2024-06-13 PROCEDURE — 97140 MANUAL THERAPY 1/> REGIONS: CPT | Performed by: OCCUPATIONAL THERAPY ASSISTANT

## 2024-06-13 NOTE — PROGRESS NOTES
to IF   Edema control   x - ^ use of coban and finger sleeve without metal stay  -lymphedema bandaging to help decrease edema in IF.    Strengthening:               Other:     Skin status  - all old skin has peeled from the IF   Splint X - gutter style PIP/ DIP flexion splint fabricated for prolonged gentle stretch (^ use to all night or as tolerated)- also to be used as target for AROM in flexion  - adjusted 5/29 to ^ flexion    x - digit exhibits a lessened blue cast and ^ more normal coloration     Assessment/Comments:  Pt. Tolerated all exercises and stretches today. Continued focus on flexion of R index finger. Trial of lymphedema bandaging to help decrease edema in IF. Will continue to advance as tolerated.       -Rehab Potential: Good   -Patient Response to Treatment: Good session today.     Patient. Education:  [x] Plans/Goals, Risks/Benefits discussed  [] Home exercise program  Method of Education: [x] Verbal  [x] Demo  [] Written  Comprehension of Education:  [x] Verbalizes understanding.  [x] Demonstrates understanding.  [] Needs Review.  [] Demonstrates/verbalizes understanding of HEP/Ed previously given.      Time In:1305          Time Out: 1400    CODE  Minutes  Units   38018 Fluidotherapy 15 1   07343 Paraffin     96929 Ultrasound     42843 Electrical Stim - Attended     13261 Iontophoresis     29768 Therapeutic Ex 15 1   86840 Therapeutic Activity 10 1   36686 Neuromuscular Re-Ed     53475 Manual Therapy 15 1   89164 ADL/COMP Tech Train     92911 Orthotic Management/Training      Other:                  Total  55 4       Plan: OT 2-3x/week for 12 sessions    [x]  Continue Plan of care with focus on ROM, stretches, edema control,  tendon glides, pain mgmt and progressive functional activity/ strengthening.: Treatment covered based on POC and graduated to patient's progress. Pt education continues at each visit to obtain maximum benefits from skilled OT intervention.  []  Alter Plan of care:   []

## 2024-06-18 ENCOUNTER — TREATMENT (OUTPATIENT)
Dept: OCCUPATIONAL THERAPY | Age: 59
End: 2024-06-18
Payer: COMMERCIAL

## 2024-06-18 DIAGNOSIS — L03.011 CELLULITIS OF FINGER OF RIGHT HAND: Primary | ICD-10-CM

## 2024-06-18 PROCEDURE — 97530 THERAPEUTIC ACTIVITIES: CPT | Performed by: OCCUPATIONAL THERAPIST

## 2024-06-18 PROCEDURE — 97110 THERAPEUTIC EXERCISES: CPT | Performed by: OCCUPATIONAL THERAPIST

## 2024-06-18 PROCEDURE — 97022 WHIRLPOOL THERAPY: CPT | Performed by: OCCUPATIONAL THERAPIST

## 2024-06-18 PROCEDURE — 97140 MANUAL THERAPY 1/> REGIONS: CPT | Performed by: OCCUPATIONAL THERAPIST

## 2024-06-18 NOTE — PROGRESS NOTES
IF  - scar massage to IF   Edema control    - ^ use of coban and finger sleeve without metal stay  -lymphedema bandaging to help decrease edema in IF.    Strengthening:     Hand strengthening x - remove bunchems from cluster using thumb to IF prehension        Other:     Skin status  - all old skin has peeled from the IF   Splint X - gutter style PIP/ DIP flexion splint fabricated for prolonged gentle stretch (^ use to all night or as tolerated)- also to be used as target for AROM in flexion  - adjusted 5/29 to ^ flexion    x - digit exhibits a lessened blue cast and ^ more normal coloration     Assessment/Comments:  ROM, stretches and tendon glides continues after scar mgmt techniques. An improvement in tendon movement is noted today after flossing techniques. Will continue to progress as tolerated with attention to AROM in the index and passive stretches.       index finger  5-10-24 5-29-24 6-18-24    MCP  0-65* 0-74*     PIP  28*-35* AROM   4*- 55* (PROM) 10*- 40*  0-70* (PROM) 10*- 50* (AROM)   0- 75* (PROM)    DIP  0 AROM   0-7* (PROM) 0-10*  0-35* (PROM)     DPC (cm)  7 cm 6 cm       -Rehab Potential: Good   -Patient Response to Treatment: Good session today.     Patient. Education:  [x] Plans/Goals, Risks/Benefits discussed  [] Home exercise program  Method of Education: [x] Verbal  [x] Demo  [] Written  Comprehension of Education:  [x] Verbalizes understanding.  [x] Demonstrates understanding.  [] Needs Review.  [] Demonstrates/verbalizes understanding of HEP/Ed previously given.      Time In:1400          Time Out: 1500    CODE  Minutes  Units   07818 Fluidotherapy 10 1   14080 Paraffin     17003 Ultrasound     65838 Electrical Stim - Attended     93950 Iontophoresis     80844 Therapeutic Ex 15 1   05802 Therapeutic Activity 15 1   39527 Neuromuscular Re-Ed     36464 Manual Therapy 20 1   68059 ADL/COMP Tech Train     21432 Orthotic Management/Training      Other: MH                 Total  60 4       Plan:

## 2024-06-20 ENCOUNTER — TREATMENT (OUTPATIENT)
Dept: OCCUPATIONAL THERAPY | Age: 59
End: 2024-06-20
Payer: COMMERCIAL

## 2024-06-20 DIAGNOSIS — L03.011 CELLULITIS OF FINGER OF RIGHT HAND: Primary | ICD-10-CM

## 2024-06-20 PROCEDURE — 97022 WHIRLPOOL THERAPY: CPT | Performed by: OCCUPATIONAL THERAPY ASSISTANT

## 2024-06-20 PROCEDURE — 97530 THERAPEUTIC ACTIVITIES: CPT | Performed by: OCCUPATIONAL THERAPY ASSISTANT

## 2024-06-20 PROCEDURE — 97110 THERAPEUTIC EXERCISES: CPT | Performed by: OCCUPATIONAL THERAPY ASSISTANT

## 2024-06-20 PROCEDURE — 97140 MANUAL THERAPY 1/> REGIONS: CPT | Performed by: OCCUPATIONAL THERAPY ASSISTANT

## 2024-06-20 NOTE — PROGRESS NOTES
the RIF/ hand in order to complete typing with ^ ease. (Goal met at 22 sec [place] testing)  6) Patient to demonstrate decreased guarding of their affected extremity from 95% to 20% or less. (Slight improvement- 80%)  7) Patient will demonstrate a non-tender/non-adherent scar. (Goal met)  8) Patient will decrease QuickDASH score to 20% or less for increased participation in daily functional activities. (Goal met at 20% disability from 40.9%)       TODAY'S TREATMENT     Pain Level: No pain is reported at Tx start/ pain is only with passive stretches to the IF    Subjective: Pt stated \"I noticed my finger turns purple in the cold\"   Objective:    Updated POC to be completed by visit 12.    INTERVENTION: COMPLETED: SPECIFICS/COMMENTS:   Modality:     Fluidotherapy R  hand with AROM x - 15 min at tx start to decrease joint stiffness and hypersensitivity   FES for IF flexion  -PPR 2-14 to facilitate flexor excursion in the Index/ slight improvement with place and hold using stim- 10 min   US x R IF  - 5 min to aid in volar scar mgmt    AROM:     Hand AROM X  X  x          X - Place and hold at PIP and at DIP  - Blocking AROM ex at PIP and DIP  - Fingertip curls with tubing to block MCPs  - Palm on ball with composite flexion - 10x  - opposition with xsmall blocks using thumb to index  - opposition around tubing to encourage DIP/PIP flexion  - flicking pom poms using index finger  - exercise balls CLW/ better today.         AAROM:     Digital AROM x - IF AAROM for flexion/ extension        PROM/Stretching:     Digital PROM X  x      x - IF PROM at the PIP and at DIP  - coban stretch in fingertip flexion as tolerated   - flexion glove stretches for IF only- start at 15 min- 3x/ day  - Traction to flexor tendon using counter pressure stretches using dycem flexion/ active extension    x - gentle joint mobilization at DIP and PIP of IF/ tolerated well        Scar Mass/Edema Control:      X  x - retrograde massage to IF  -

## 2024-06-25 ENCOUNTER — TREATMENT (OUTPATIENT)
Dept: OCCUPATIONAL THERAPY | Age: 59
End: 2024-06-25
Payer: COMMERCIAL

## 2024-06-25 DIAGNOSIS — L03.011 CELLULITIS OF FINGER OF RIGHT HAND: Primary | ICD-10-CM

## 2024-06-25 PROCEDURE — 97022 WHIRLPOOL THERAPY: CPT | Performed by: OCCUPATIONAL THERAPIST

## 2024-06-25 PROCEDURE — 97110 THERAPEUTIC EXERCISES: CPT | Performed by: OCCUPATIONAL THERAPIST

## 2024-06-25 PROCEDURE — 97140 MANUAL THERAPY 1/> REGIONS: CPT | Performed by: OCCUPATIONAL THERAPIST

## 2024-06-25 NOTE — PROGRESS NOTES
4  OCCUPATIONAL THERAPY DAILY NOTE/PROGRESS UPDATE - ON HOLD/POSSIBLE DISCHARGE  [facility] [place] [facility] OCCUPATIONAL THERAPY   [address]  [place] [address]  Dept: [phone]  Loc: [phone]   [facility] Fax: [phone]      Date:  2024  Initial Evaluation Date: 5-10-24          Evaluating Therapist: Chelita Urbina OT    Patient Name:  Arminda Nelson    :  1965    Restrictions/Precautions:  ROM as tolerated, Low fall risk  Diagnosis:  L03.011 (ICD-10-CM) - Cellulitis of finger of right hand                                                              Date of Surgery/Injury: 24 R index finger I&D     Insurance/Certification information:  [facility] combined PT/OT/SP  Plan of care signed (Y/N): N  Visit# / total visits: 12  visits     Referring Practitioner:  Dr Wm EZE Shah  Specific Practitioner Orders: OT evaluate and treat     Assessment of current deficits   [] Functional mobility             [x] ADLs           [x] Strength                  [] Cognition   [] Functional transfers           [x] IADLs          [] Safety Awareness  [] Endurance   [x] Fine Motor Coordination    [] Balance      [] Vision/perception    [x] Sensation     [] Gross Motor Coordination [x] ROM           [x] Pain                        [x] Edema          [x] Scar Adhesion/Skin Integrity      OT PLAN OF CARE   OT POC based on physician orders, patient diagnosis and results of clinical assessment     Frequency/Duration: 2-3x/ week x 12 visits     Specific OT Treatment to include:   [x] Instruction in HEP                   Modalities:  [x] Therapeutic Exercise                 [x] Ultrasound               [] Electrical Stimulation/Attended  [x] PROM/Stretching                    [x] Fluidotherapy          [x]  Paraffin                   [x] AAROM  [x] AROM                 [] Iontophoresis:   [x] Tendon Glides                                               []

## 2024-07-18 ENCOUNTER — OFFICE VISIT (OUTPATIENT)
Dept: ORTHOPEDIC SURGERY | Age: 59
End: 2024-07-18

## 2024-07-18 VITALS — TEMPERATURE: 98 F | HEIGHT: 67 IN | BODY MASS INDEX: 35 KG/M2 | WEIGHT: 223 LBS

## 2024-07-18 DIAGNOSIS — M65.141 SUPPURATIVE TENOSYNOVITIS OF FLEXOR TENDON OF RIGHT HAND: Primary | ICD-10-CM

## 2024-07-18 DIAGNOSIS — L03.011 CELLULITIS OF FINGER OF RIGHT HAND: ICD-10-CM

## 2024-07-18 PROCEDURE — 99024 POSTOP FOLLOW-UP VISIT: CPT | Performed by: ORTHOPAEDIC SURGERY

## 2024-07-18 NOTE — PROGRESS NOTES
Arminda Nelson is here for followup after right index finger I&D surgery 4/24/24. She has been in Ot for her finger. She stated she is doing better but still has trouble with her bend.        Objective:         General :    alert, appears stated age, and cooperative   Sutures:   Sutures out.   Incision:  healing well, no significant drainage, no dehiscence, no significant erythema   Tenderness:  none   Flexion ROM:  diminished range of motion 60 (20 active) DIP MCP 90   Extension ROM:  diminished range of motion   Effusion:  mild         Assessment:        Encounter Diagnoses   Name Primary?    Suppurative tenosynovitis of flexor tendon of right hand Yes    Cellulitis of finger of right hand             Plan:     Despite OT her ROM has not returned to normal.  Her swelling has improved.  I will refer her to Dr. Whitman for a second opinion

## 2024-07-29 PROBLEM — L03.90 SEPSIS DUE TO CELLULITIS (HCC): Status: ACTIVE | Noted: 2024-04-27

## 2024-09-20 ENCOUNTER — EVALUATION (OUTPATIENT)
Dept: OCCUPATIONAL THERAPY | Age: 59
End: 2024-09-20
Payer: COMMERCIAL

## 2024-09-20 DIAGNOSIS — L03.011 CELLULITIS OF FINGER OF RIGHT HAND: Primary | ICD-10-CM

## 2024-09-20 PROCEDURE — 97530 THERAPEUTIC ACTIVITIES: CPT | Performed by: OCCUPATIONAL THERAPIST

## 2024-09-20 PROCEDURE — 97760 ORTHOTIC MGMT&TRAING 1ST ENC: CPT | Performed by: OCCUPATIONAL THERAPIST

## 2024-09-20 PROCEDURE — 97165 OT EVAL LOW COMPLEX 30 MIN: CPT | Performed by: OCCUPATIONAL THERAPIST

## 2024-09-23 ENCOUNTER — EVALUATION (OUTPATIENT)
Dept: OCCUPATIONAL THERAPY | Age: 59
End: 2024-09-23
Payer: COMMERCIAL

## 2024-09-23 DIAGNOSIS — M25.60 STIFFNESS OF UNSPECIFIED JOINT, NOT ELSEWHERE CLASSIFIED: Primary | ICD-10-CM

## 2024-09-23 DIAGNOSIS — M67.80 OTHER SPECIFIED DISORDERS OF SYNOVIUM AND TENDON, UNSPECIFIED SITE: ICD-10-CM

## 2024-09-23 PROCEDURE — 97140 MANUAL THERAPY 1/> REGIONS: CPT | Performed by: OCCUPATIONAL THERAPIST

## 2024-09-23 PROCEDURE — 97110 THERAPEUTIC EXERCISES: CPT | Performed by: OCCUPATIONAL THERAPIST

## 2024-09-23 PROCEDURE — 97530 THERAPEUTIC ACTIVITIES: CPT | Performed by: OCCUPATIONAL THERAPIST

## 2024-09-25 ENCOUNTER — TREATMENT (OUTPATIENT)
Dept: OCCUPATIONAL THERAPY | Age: 59
End: 2024-09-25
Payer: COMMERCIAL

## 2024-09-25 DIAGNOSIS — M25.60 STIFFNESS OF UNSPECIFIED JOINT, NOT ELSEWHERE CLASSIFIED: Primary | ICD-10-CM

## 2024-09-25 DIAGNOSIS — M67.80 OTHER SPECIFIED DISORDERS OF SYNOVIUM AND TENDON, UNSPECIFIED SITE: ICD-10-CM

## 2024-09-25 PROCEDURE — 97140 MANUAL THERAPY 1/> REGIONS: CPT | Performed by: OCCUPATIONAL THERAPIST

## 2024-09-25 PROCEDURE — 97530 THERAPEUTIC ACTIVITIES: CPT | Performed by: OCCUPATIONAL THERAPIST

## 2024-09-25 PROCEDURE — 97110 THERAPEUTIC EXERCISES: CPT | Performed by: OCCUPATIONAL THERAPIST

## 2024-09-27 ENCOUNTER — TREATMENT (OUTPATIENT)
Dept: OCCUPATIONAL THERAPY | Age: 59
End: 2024-09-27

## 2024-09-27 DIAGNOSIS — M25.60 STIFFNESS OF UNSPECIFIED JOINT, NOT ELSEWHERE CLASSIFIED: Primary | ICD-10-CM

## 2024-09-27 DIAGNOSIS — M67.80 OTHER SPECIFIED DISORDERS OF SYNOVIUM AND TENDON, UNSPECIFIED SITE: ICD-10-CM

## 2024-09-30 ENCOUNTER — TREATMENT (OUTPATIENT)
Dept: OCCUPATIONAL THERAPY | Age: 59
End: 2024-09-30
Payer: COMMERCIAL

## 2024-09-30 DIAGNOSIS — M67.80 OTHER SPECIFIED DISORDERS OF SYNOVIUM AND TENDON, UNSPECIFIED SITE: ICD-10-CM

## 2024-09-30 DIAGNOSIS — M25.60 STIFFNESS OF UNSPECIFIED JOINT, NOT ELSEWHERE CLASSIFIED: Primary | ICD-10-CM

## 2024-09-30 PROCEDURE — 97022 WHIRLPOOL THERAPY: CPT | Performed by: OCCUPATIONAL THERAPIST

## 2024-09-30 PROCEDURE — 97140 MANUAL THERAPY 1/> REGIONS: CPT | Performed by: OCCUPATIONAL THERAPIST

## 2024-09-30 PROCEDURE — 97530 THERAPEUTIC ACTIVITIES: CPT | Performed by: OCCUPATIONAL THERAPIST

## 2024-09-30 PROCEDURE — 97110 THERAPEUTIC EXERCISES: CPT | Performed by: OCCUPATIONAL THERAPIST

## 2024-09-30 NOTE — PROGRESS NOTES
[x] AAROM  [x] AROM                 [] Iontophoresis:   [x] Tendon Glides                                               [] Neuromuscular Re-Ed            [] ADL/IADL re-training    [x] Therapeutic Activity                  [x] Pain Management with/without modalities PRN                 [x] Manual Therapy                      [x] Splinting                                   [x] Scar Management                   []Joint Protection/Training  []Ergonomics                             [x] Joint Mobilization                      [] Adaptive Equipment Assessment/Training                             [] Manual Edema Mobilization   [x] Myofascial Release                 [] Energy Conservation/Work Simplification  [x] GM/FM Coordination                [] Safety retraining/education per  individual diagnosis/goals  [] Desensitization        Patient Specific Goal: To move the index finger better so it can be used                             GOALS (Long term same as Short term):  1) Patient will demonstrate good understanding of home program (exercises/activities/diagnosis/prognosis/goals) with good accuracy.   2) Patient will demonstrate increased active/passive range of motion of their R IF to <1 cm DPC with full extension  in efforts to attain a firmer grasp for ADL/IADL completion.  3) Patient will demonstrate /pinch strength of at least 35 / 5-7 pinch pounds of their R hand.   4) Patient to report decreased pain in their affected R hand/ upper extremity from 3/10 with light movement  to 1/10 or less with resistive daily functional use.   5) Increase in fine motor function in the R hand evidenced by improved ease of writing, typing and manipulating small items without dropping.   6) Patient to demonstrate decreased guarding of their affected IF from 100% to 20% or less.   7) Patient will be knowledgeable of edema control techniques as evident with decreases from moderate to mild/none.   8) Patient will

## 2024-10-03 ENCOUNTER — TREATMENT (OUTPATIENT)
Dept: OCCUPATIONAL THERAPY | Age: 59
End: 2024-10-03
Payer: COMMERCIAL

## 2024-10-03 DIAGNOSIS — M67.80 OTHER SPECIFIED DISORDERS OF SYNOVIUM AND TENDON, UNSPECIFIED SITE: ICD-10-CM

## 2024-10-03 DIAGNOSIS — M25.60 STIFFNESS OF UNSPECIFIED JOINT, NOT ELSEWHERE CLASSIFIED: Primary | ICD-10-CM

## 2024-10-03 PROCEDURE — 97022 WHIRLPOOL THERAPY: CPT | Performed by: OCCUPATIONAL THERAPIST

## 2024-10-03 PROCEDURE — 97140 MANUAL THERAPY 1/> REGIONS: CPT | Performed by: OCCUPATIONAL THERAPIST

## 2024-10-03 PROCEDURE — 97110 THERAPEUTIC EXERCISES: CPT | Performed by: OCCUPATIONAL THERAPIST

## 2024-10-03 PROCEDURE — 97530 THERAPEUTIC ACTIVITIES: CPT | Performed by: OCCUPATIONAL THERAPIST

## 2024-10-03 NOTE — PROGRESS NOTES
OCCUPATIONAL THERAPY DAILY NOTE  [facility] [place] [facility] OCCUPATIONAL THERAPY   [address] [place] [address]  Dept: [phone]  Loc: [phone]   [facility] Fax: [phone]      Date:  10/3/2024  Initial Evaluation Date: 24     Evaluating Therapist: Chelita Urbina OT    Patient Name:  Arminda Nelson    :  1965    Restrictions/Precautions:  ROM as tolerated, Low fall risk  Diagnosis:  M25.60 Stiffness of unspecified joint  M67.80 Other specified disorders of synovium and tendon, unspecified site                                   Date of Surgery/Injury: 24 s/p flexor tendon tenolysis     Insurance/Certification information: [facility] ( 24 combined OT/PT/SP hard max- 12 visits used)  Plan of care signed (Y/N): N  Visit# / total visits: 6 / 10 visits ( remaining benefit)     Referring Practitioner: Dr Rodney Whitman  Specific Practitioner Orders: OT evaluate and treat- PROM, AAROM, AROM, stretching, scar mgmt, strengthening and modalities as needed.     Assessment of current deficits   [] Functional mobility             [x] ADLs           [x] Strength                  [] Cognition   [] Functional transfers           [x] IADLs          [] Safety Awareness  [] Endurance   [x] Fine Motor Coordination    [] Balance      [] Vision/perception    [x] Sensation     [] Gross Motor Coordination [x] ROM           [x] Pain                        [x] Edema          [x] Scar Adhesion/Skin Integrity      OT PLAN OF CARE   OT POC based on physician orders, patient diagnosis and results of clinical assessment     Frequency/Duration: 2-3x/ week x 10 visits- pt may require additional OT     Specific OT Treatment to include:   [x] Instruction in HEP                   Modalities:  [x] Therapeutic Exercise                 [] Ultrasound               [] Electrical Stimulation/Attended  [x] PROM/Stretching                    [x] Fluidotherapy          [x]  Paraffin

## 2024-10-08 ENCOUNTER — TREATMENT (OUTPATIENT)
Dept: OCCUPATIONAL THERAPY | Age: 59
End: 2024-10-08
Payer: COMMERCIAL

## 2024-10-08 DIAGNOSIS — M67.80 OTHER SPECIFIED DISORDERS OF SYNOVIUM AND TENDON, UNSPECIFIED SITE: ICD-10-CM

## 2024-10-08 DIAGNOSIS — M25.60 STIFFNESS OF UNSPECIFIED JOINT, NOT ELSEWHERE CLASSIFIED: Primary | ICD-10-CM

## 2024-10-08 PROCEDURE — 97022 WHIRLPOOL THERAPY: CPT | Performed by: OCCUPATIONAL THERAPIST

## 2024-10-08 PROCEDURE — 97110 THERAPEUTIC EXERCISES: CPT | Performed by: OCCUPATIONAL THERAPIST

## 2024-10-08 PROCEDURE — 97530 THERAPEUTIC ACTIVITIES: CPT | Performed by: OCCUPATIONAL THERAPIST

## 2024-10-08 PROCEDURE — 97140 MANUAL THERAPY 1/> REGIONS: CPT | Performed by: OCCUPATIONAL THERAPIST

## 2024-10-08 NOTE — PROGRESS NOTES
demonstrate a non-tender/non-adherent scar.   9) Enhance flexor tendon gliding in the affected IF to prevent adhesions and deformity.       TODAY'S TREATMENT     Pain Level: No pain at Tx start/ Mild pain with ROM (tight/ uncomfortable)    Subjective: Pt feels her finger is moving better but it gets real cold with the cooler weather.     Objective:    Updated POC to be completed by visit 10.    INTERVENTION: COMPLETED: SPECIFICS/COMMENTS:   Modality:     Fluidotherapy w/ AROM x - 10 min to decrease joint stiffness   US x volar scar  - 5 min 50% .7 to prevent dense scarring and imporve swelling   AROM / AAROM     Hand/ finger AROM/ AAROM X  X  X  X  X  x - Isolated MCP flexion/ extension- 10x  - Fingertip curls around red dowel- 10x  - blocking ex at PIP- DIP- 10x each  - tendon glides> continue at home  - Place and hold fisting  - towel grasp and release 10x    X      X    x - exercise balls CLW 20x  - screw manipulation  - in hand manipulation with small washers   - remove bunchems cluster using tip pinch to index finger  - rubik ball using in hand manipulation              PROM/Stretching:     PROM IF/ composite X  X   - PROM at PIP and then DIP  - Aggressive PROM composite/ including moderate prolonged stretch  - resume use flexion glove for prolonged IF stretch        Scar Mass/Edema Control:     Scar massage x - manual/ vibration   Retrograde massage x - As tolerate to IF with avoidance of suture line   Strengthening:     Hand strengthening   x - Squeeze and manipulate numbered foam block.   - velcro board - large roll digit flex/ ext 10x, velcro tabs        Other:     Splint      - pt to bring in old dynamic finger flexion splint to see if appropriate to use now  - New gutter splint fabricated for bed time use on the IF   HEP X  X  X  X  X  x - splint wear/ care  - edema control techniques  - isolated MCP flex/ ext  - Finger tip curls  - self PROM each joint and composite flexion  - flexion glove for IF

## 2024-10-11 ENCOUNTER — TREATMENT (OUTPATIENT)
Dept: OCCUPATIONAL THERAPY | Age: 59
End: 2024-10-11

## 2024-10-11 DIAGNOSIS — M25.60 STIFFNESS OF UNSPECIFIED JOINT, NOT ELSEWHERE CLASSIFIED: Primary | ICD-10-CM

## 2024-10-11 DIAGNOSIS — M67.80 OTHER SPECIFIED DISORDERS OF SYNOVIUM AND TENDON, UNSPECIFIED SITE: ICD-10-CM

## 2024-10-11 NOTE — PROGRESS NOTES
OCCUPATIONAL THERAPY DAILY NOTE  [facility] [place] [facility] OCCUPATIONAL THERAPY   [address] [place] [address]  Dept: [phone]  Loc: [phone]   [facility] OT Fax: [phone]      Date:  10/11/2024  Initial Evaluation Date: 24     Evaluating Therapist: Chelita Urbina OT    Patient Name:  Arminda Nelson    :  1965    Restrictions/Precautions:  ROM as tolerated, Low fall risk  Diagnosis:  M25.60 Stiffness of unspecified joint  M67.80 Other specified disorders of synovium and tendon, unspecified site                                   Date of Surgery/Injury: 24 s/p flexor tendon tenolysis     Insurance/Certification information: [facility] ( 24 combined OT/PT/SP hard max- 14 visits used)  Plan of care signed (Y/N): N  Visit# / total visits: 8 / 10 visits ( remaining benefit)     Referring Practitioner: Dr Rodney Whitman  Specific Practitioner Orders: OT evaluate and treat- PROM, AAROM, AROM, stretching, scar mgmt, strengthening and modalities as needed.     Assessment of current deficits   [] Functional mobility             [x] ADLs           [x] Strength                  [] Cognition   [] Functional transfers           [x] IADLs          [] Safety Awareness  [] Endurance   [x] Fine Motor Coordination    [] Balance      [] Vision/perception    [x] Sensation     [] Gross Motor Coordination [x] ROM           [x] Pain                        [x] Edema          [x] Scar Adhesion/Skin Integrity      OT PLAN OF CARE   OT POC based on physician orders, patient diagnosis and results of clinical assessment     Frequency/Duration: 2-3x/ week x 10 visits- pt may require additional OT     Specific OT Treatment to include:   [x] Instruction in HEP                   Modalities:  [x] Therapeutic Exercise                 [] Ultrasound               [] Electrical Stimulation/Attended  [x] PROM/Stretching                    [x] Fluidotherapy          [x]  Paraffin

## 2024-10-17 ENCOUNTER — TREATMENT (OUTPATIENT)
Dept: OCCUPATIONAL THERAPY | Age: 59
End: 2024-10-17

## 2024-10-17 DIAGNOSIS — M67.80 OTHER SPECIFIED DISORDERS OF SYNOVIUM AND TENDON, UNSPECIFIED SITE: ICD-10-CM

## 2024-10-17 DIAGNOSIS — M25.60 STIFFNESS OF UNSPECIFIED JOINT, NOT ELSEWHERE CLASSIFIED: Primary | ICD-10-CM

## 2024-10-17 NOTE — PROGRESS NOTES
to demonstrate decreased guarding of their affected IF from 100% to 20% or less. ( Goal met- Guarding is at 20%)  7) Patient will be knowledgeable of edema control techniques as evident with decreases from moderate to mild/none. (Goal met- mild swelling is noted intermittently in the affected finger)  8) Patient will demonstrate a non-tender/non-adherent scar. (Goal met for tenderness/ adhesions)  9) Enhance flexor tendon gliding in the affected IF to prevent adhesions and deformity. (Goal met)      TODAY'S TREATMENT     Pain Level: No pain     Subjective: Pt states she has been using her hand more heavily and hasn't had issues.    Objective:    Updated POC to be completed by visit 10.    INTERVENTION: COMPLETED: SPECIFICS/COMMENTS:   Modality:     Fluidotherapy w/ AROM x - 10 min to decrease joint stiffness   US x volar scar  - 5 min 50% .7 to prevent dense scarring and imporve swelling   AROM / AAROM     Hand/ finger AROM/ AAROM         X   - Isolated MCP flexion/ extension- 10x  - Fingertip curls around red dowel- 10x  - blocking ex at PIP- DIP- 10x each  - tendon glides> continue at home  - Place and hold fisting  - towel grasp and release ^ 20x               - exercise balls CLW 20x  - screw manipulation  - in hand manipulation with small washers   - remove bunchems cluster using tip pinch to index finger  - rubik ball using in hand manipulation  and IF flexion             PROM/Stretching:     PROM IF/ composite X  X   - PROM at PIP and then DIP  - Aggressive PROM composite/ including moderate prolonged stretch  - resume use flexion glove for prolonged IF stretch        Scar Mass/Edema Control:     Scar massage x - manual/ vibration   Retrograde massage x - As tolerate to IF   Strengthening:     Hand strengthening      - Squeeze and manipulate numbered foam block.   - velcro board - large roll digit flex/ ext 10x, velcro tabs  - in hand manipulation with soft putty 4 coins        Other:     Splint      - all

## 2025-01-11 ENCOUNTER — HOSPITAL ENCOUNTER (EMERGENCY)
Age: 60
Discharge: HOME | End: 2025-01-11
Payer: COMMERCIAL

## 2025-01-11 ENCOUNTER — APPOINTMENT (OUTPATIENT)
Dept: GENERAL RADIOLOGY | Age: 60
End: 2025-01-11
Payer: COMMERCIAL

## 2025-01-11 VITALS
DIASTOLIC BLOOD PRESSURE: 107 MMHG | RESPIRATION RATE: 16 BRPM | OXYGEN SATURATION: 100 % | TEMPERATURE: 98.1 F | SYSTOLIC BLOOD PRESSURE: 142 MMHG | HEART RATE: 95 BPM

## 2025-01-11 DIAGNOSIS — S93.401A SPRAIN OF RIGHT ANKLE, UNSPECIFIED LIGAMENT, INITIAL ENCOUNTER: Primary | ICD-10-CM

## 2025-01-11 PROCEDURE — 99283 EMERGENCY DEPT VISIT LOW MDM: CPT

## 2025-01-11 PROCEDURE — 73562 X-RAY EXAM OF KNEE 3: CPT

## 2025-01-11 PROCEDURE — 73610 X-RAY EXAM OF ANKLE: CPT

## 2025-01-11 PROCEDURE — 73630 X-RAY EXAM OF FOOT: CPT

## 2025-01-11 PROCEDURE — 73560 X-RAY EXAM OF KNEE 1 OR 2: CPT

## 2025-01-11 RX ORDER — NAPROXEN 500 MG/1
500 TABLET ORAL 2 TIMES DAILY WITH MEALS
Qty: 10 TABLET | Refills: 0 | Status: SHIPPED | OUTPATIENT
Start: 2025-01-11 | End: 2025-01-15

## 2025-01-11 ASSESSMENT — LIFESTYLE VARIABLES
HOW MANY STANDARD DRINKS CONTAINING ALCOHOL DO YOU HAVE ON A TYPICAL DAY: PATIENT DOES NOT DRINK
HOW OFTEN DO YOU HAVE A DRINK CONTAINING ALCOHOL: NEVER

## 2025-01-11 ASSESSMENT — PAIN - FUNCTIONAL ASSESSMENT: PAIN_FUNCTIONAL_ASSESSMENT: NONE - DENIES PAIN

## 2025-01-11 NOTE — ED PROVIDER NOTES
Independent ANDRES Visit.       [facility]  Department of Emergency Medicine   ED  Encounter Note  Admit Date/RoomTime: 2025  5:19 PM  ED Room:     NAME: Arminda Nelson  : 1965  MRN: [id number]     Chief Complaint:  Foot Pain (Slipped and fell on snow, R ankle pain )    History of Present Illness         Arminda Nelson is a 59 y.o. old female presenting to the emergency department by private vehicle, for traumatic Right foot and ankle pain which occured a few hour(s) prior to arrival.  The complaint is due to slipping on the snow when taking the trash out.  Since onset the symptoms have been unchanged with ability to bear weight, but with some pain, bruising, and swelling.  Patient has no prior history of pain/injury with regards to today's visit. Her pain is aggraveated by pressure on or palpation of painful area or any attempt to bear weight and relieved by Advil which she took prior to arrival. She does not wish to receive anything for pain.  She denies any head injury, headache, loss of consciousness, confusion, dizziness, neck pain, chest pain, abdominal pain, back pain, numbness, weakness, blurred vision, nausea, vomiting, fever, chills, wounds, or rash. Patient also reports she is having some right knee pain.   Tetanus Status: unknown.    ROS   Pertinent positives and negatives are stated within HPI, all other systems reviewed and are negative.    Past Medical History:  has a past medical history of Arthritis and Carpal tunnel syndrome, left.    Surgical History:  has a past surgical history that includes knee surgery (Right); Breast cyst excision; Knee arthroscopy; Hysterectomy; Carpal tunnel release (Left, 3/6/2020); Breast lumpectomy (Right, ); US Breast Fine Needle Aspiration (Right); and Hand surgery (Right, 2024).    Social History:  reports that she has never smoked. She has never used smokeless tobacco. She reports current alcohol use. She reports that she  Diagnostic studies revealed ***. Consults included ***. Results were discussed with ***.  Patient was given *** for their symptoms {improvement:548871}. Patient will be discharged home with the following prescriptions, ***.  Discussed appropriate use and potential side effects of starting the prescribed medications. Patient continues to be non-toxic on re-evaluation. Findings were discussed with the patient and reasons to immediately return to the ED were articulated to them. They will follow-up with their PMD and ***.      Discharge Instructions:   Patient referred to  No follow-up provider specified.    MEDICATIONS:   DISCHARGE MEDICATIONS:  New Prescriptions    No medications on file       DISCONTINUED MEDICATIONS:  Discontinued Medications    No medications on file       Record Review:  {Records Reviewed (Optional):72988}       Disposition Considerations:  This patient's ED course included: {ED Events:20749::\"a personal history and physicial examination\"}  This patient has {ED Patient's Course:20751::remained hemodynamically stable} during their ED course.       I emphasized the importance of follow-up with the physician I referred them to in the timeframe recommended.  I discussed with the patient emergent symptoms and the need to immediately return to the ER. Written information was included in their discharge instructions. Additional verbal discharge instructions were also given and discussed with the patient to supplement those generated by the EMR. We also discussed medications that were prescribed  (if any) including common side effects and interactions. The patient was advised to abstain from driving, operating heavy machinery or making significant decisions while taking medications such as opiates and muscle relaxers that may impair this. All questions were addressed.  They understand return precautions and discharge instructions. The {Persons; Family Members:22445}  expressed understanding. Vitals were

## 2025-01-13 ENCOUNTER — TELEPHONE (OUTPATIENT)
Dept: ORTHOPEDIC SURGERY | Age: 60
End: 2025-01-13

## 2025-01-13 NOTE — TELEPHONE ENCOUNTER
Patient calling to schedule her Ed follow up 01/11, [facility]-Sprain of right ankle, unspecified ligament, initial encounter. Please contact pt.

## 2025-01-15 ENCOUNTER — OFFICE VISIT (OUTPATIENT)
Dept: ORTHOPEDIC SURGERY | Age: 60
End: 2025-01-15
Payer: COMMERCIAL

## 2025-01-15 VITALS — BODY MASS INDEX: 35 KG/M2 | HEIGHT: 67 IN | WEIGHT: 223 LBS | TEMPERATURE: 98.6 F

## 2025-01-15 DIAGNOSIS — M76.811 ANTERIOR TIBIALIS TENDONITIS OF RIGHT LEG: Primary | ICD-10-CM

## 2025-01-15 DIAGNOSIS — S93.601A SPRAIN OF RIGHT FOOT, INITIAL ENCOUNTER: ICD-10-CM

## 2025-01-15 PROCEDURE — 99203 OFFICE O/P NEW LOW 30 MIN: CPT | Performed by: FAMILY MEDICINE

## 2025-01-15 RX ORDER — NAPROXEN 500 MG/1
500 TABLET ORAL 2 TIMES DAILY WITH MEALS
Qty: 14 TABLET | Refills: 0 | Status: SHIPPED | OUTPATIENT
Start: 2025-01-15 | End: 2025-01-22

## 2025-01-15 ASSESSMENT — ENCOUNTER SYMPTOMS
VOMITING: 0
NAUSEA: 0
ABDOMINAL PAIN: 0
COUGH: 0
DIARRHEA: 0
SHORTNESS OF BREATH: 0
CHEST TIGHTNESS: 0
CONSTIPATION: 0

## 2025-01-15 NOTE — PROGRESS NOTES
reevaluation of symptoms and consider escalation therapy should symptoms persist.  Patient is agreeable with above plan all questions and concerns were addressed in the office today.      Return to Office: Return in about 4 weeks (around 2/12/2025).    Orville Billingsley, DO

## 2025-02-12 ENCOUNTER — OFFICE VISIT (OUTPATIENT)
Dept: ORTHOPEDIC SURGERY | Age: 60
End: 2025-02-12
Payer: COMMERCIAL

## 2025-02-12 VITALS — HEIGHT: 67 IN | WEIGHT: 223 LBS | BODY MASS INDEX: 35 KG/M2

## 2025-02-12 DIAGNOSIS — M79.671 CHRONIC FOOT PAIN, RIGHT: Primary | ICD-10-CM

## 2025-02-12 DIAGNOSIS — S86.219A TEAR OF TIBIALIS ANTERIOR TENDON: ICD-10-CM

## 2025-02-12 DIAGNOSIS — G89.29 CHRONIC FOOT PAIN, RIGHT: Primary | ICD-10-CM

## 2025-02-12 PROCEDURE — 99213 OFFICE O/P EST LOW 20 MIN: CPT | Performed by: FAMILY MEDICINE

## 2025-02-12 RX ORDER — BETAMETHASONE SODIUM PHOSPHATE AND BETAMETHASONE ACETATE 3; 3 MG/ML; MG/ML
6 INJECTION, SUSPENSION INTRA-ARTICULAR; INTRALESIONAL; INTRAMUSCULAR; SOFT TISSUE ONCE
Status: CANCELLED | OUTPATIENT
Start: 2025-02-12 | End: 2025-02-12

## 2025-02-12 RX ORDER — OXYCODONE HYDROCHLORIDE 5 MG/1
TABLET ORAL
COMMUNITY
Start: 2024-09-19

## 2025-02-12 RX ORDER — IBUPROFEN 200 MG
TABLET ORAL
COMMUNITY
Start: 2024-09-17

## 2025-02-12 RX ORDER — LIDOCAINE HYDROCHLORIDE 10 MG/ML
1 INJECTION, SOLUTION INFILTRATION; PERINEURAL ONCE
Status: CANCELLED | OUTPATIENT
Start: 2025-02-12 | End: 2025-02-12

## 2025-02-12 ASSESSMENT — ENCOUNTER SYMPTOMS
ABDOMINAL PAIN: 0
COUGH: 0
NAUSEA: 0
CHEST TIGHTNESS: 0
VOMITING: 0
SHORTNESS OF BREATH: 0
DIARRHEA: 0
CONSTIPATION: 0

## 2025-02-12 NOTE — PROGRESS NOTES
mouth      methotrexate (RHEUMATREX) 2.5 MG chemo tablet       folic acid (FOLVITE) 1 MG tablet Take 1 tablet by mouth daily      oxyCODONE (ROXICODONE) 5 MG immediate release tablet Take by mouth. (Patient not taking: Reported on 2/12/2025)      naproxen (NAPROSYN) 500 MG tablet Take 1 tablet by mouth 2 times daily (with meals) for 7 days 14 tablet 0     No current facility-administered medications for this visit.      ______________________________________________________________________    Physical Exam:    Vitals: Ht 1.702 m (5' 7\")   Wt 101.2 kg (223 lb)   BMI 34.93 kg/m²   General Appearance: Nontoxic, awake, alert, and in no acute distress.  Ear, Nose, and Throat: Normocephalic, atraumatic, moist membranes, anicteric sclera  Chest wall/Lung: Respirations regular and unlabored. No cyanosis.  Heart: RRR, distal pulses intact.  Neurologic: Alert&Oriented x3. Sensation grossly intact. No focal motor deficits detected.  Musculoskeletal: Examination of the right ankle: Ankle range of motion continues to be limited with dorsiflexion and inversion.  Swelling throughout the dorsum of the foot down to the toes as well as the lateral ankle. Tenderness to palpation in the region of the distal anterior tibialis tendon and the shaft of the fifth MTP. Achilles tendon is intact with a negative Mckeon test. Anterior drawer test is negative. Talar tilt test is negative. Tibiofibular squeeze test and external rotation stress test are negative.     Imaging:  Multiple views of the right ankle, knee, and foot demonstrate no evidence of fracture or dislocation. X-rays were personally reviewed and interpreted by myself. Radiology reports were also reviewed.    ______________________________________________________________________    Assessment & Plan :  1. Chronic foot pain, right  - MRI FOOT RIGHT WO CONTRAST; Future    2. Tear of tibialis anterior tendon  - MRI FOOT RIGHT WO CONTRAST; Future      Patient presents to the office

## 2025-03-02 ENCOUNTER — HOSPITAL ENCOUNTER (OUTPATIENT)
Dept: MRI IMAGING | Age: 60
Discharge: HOME | End: 2025-03-04
Attending: FAMILY MEDICINE
Payer: COMMERCIAL

## 2025-03-02 DIAGNOSIS — G89.29 CHRONIC FOOT PAIN, RIGHT: ICD-10-CM

## 2025-03-02 DIAGNOSIS — M79.671 CHRONIC FOOT PAIN, RIGHT: ICD-10-CM

## 2025-03-02 DIAGNOSIS — S86.219A TEAR OF TIBIALIS ANTERIOR TENDON: ICD-10-CM

## 2025-03-02 PROCEDURE — 73718 MRI LOWER EXTREMITY W/O DYE: CPT

## 2025-03-04 DIAGNOSIS — M79.671 CHRONIC FOOT PAIN, RIGHT: Primary | ICD-10-CM

## 2025-03-04 DIAGNOSIS — G89.29 CHRONIC FOOT PAIN, RIGHT: Primary | ICD-10-CM

## 2025-03-04 DIAGNOSIS — S93.601A SPRAIN OF RIGHT FOOT, INITIAL ENCOUNTER: ICD-10-CM

## 2025-03-04 NOTE — RESULT ENCOUNTER NOTE
Spoke with patient about results. Will refer to ortho foot and ankle, Dr. Vasquez. All questions were answered and she was agreeable with the plan.

## 2025-08-14 ENCOUNTER — TRANSCRIBE ORDERS (OUTPATIENT)
Dept: ADMINISTRATIVE | Age: 60
End: 2025-08-14

## 2025-08-14 DIAGNOSIS — Z12.31 ENCOUNTER FOR SCREENING MAMMOGRAM FOR MALIGNANT NEOPLASM OF BREAST: ICD-10-CM

## 2025-08-14 DIAGNOSIS — R92.8 OTHER ABNORMAL AND INCONCLUSIVE FINDINGS ON DIAGNOSTIC IMAGING OF BREAST: Primary | ICD-10-CM

## 2025-08-29 ENCOUNTER — TRANSCRIBE ORDERS (OUTPATIENT)
Dept: ADMINISTRATIVE | Age: 60
End: 2025-08-29

## 2025-08-29 DIAGNOSIS — Z12.31 ENCOUNTER FOR SCREENING MAMMOGRAM FOR MALIGNANT NEOPLASM OF BREAST: Primary | ICD-10-CM

## (undated) DEVICE — ELECTRODE PT RET AD L9FT HI MOIST COND ADH HYDRGEL CORDED

## (undated) DEVICE — GLOVE ORANGE PI 8   MSG9080

## (undated) DEVICE — BASIC DOUBLE BASIN 2-LF: Brand: MEDLINE INDUSTRIES, INC.

## (undated) DEVICE — STRIP,CLOSURE,WOUND,MEDI-STRIP,1/4X3: Brand: MEDLINE

## (undated) DEVICE — GAUZE,SPONGE,4"X4",16PLY,STRL,LF,10/TRAY: Brand: MEDLINE

## (undated) DEVICE — PACK,EXTREMITY I: Brand: MEDLINE

## (undated) DEVICE — GLOVE ORTHO 8   MSG9480

## (undated) DEVICE — PADDING,UNDERCAST,COTTON, 4"X4YD STERILE: Brand: MEDLINE

## (undated) DEVICE — ZIMMER® STERILE DISPOSABLE TOURNIQUET CUFF WITH PLC, DUAL PORT, SINGLE BLADDER, 18 IN. (46 CM)

## (undated) DEVICE — TOWEL,OR,DSP,ST,BLUE,STD,6/PK,12PK/CS: Brand: MEDLINE

## (undated) DEVICE — BANDAGE COMPR W3INXL5YD WHT BGE POLY COT M E WRP WV HK AND

## (undated) DEVICE — SURGICAL PROCEDURE PACK HND

## (undated) DEVICE — ELECTRODE ES L3IN S STL BLDE INSUL DISP VALLEYLAB EDGE

## (undated) DEVICE — DOUBLE BASIN SET: Brand: MEDLINE INDUSTRIES, INC.

## (undated) DEVICE — 4-PORT MANIFOLD: Brand: NEPTUNE 2

## (undated) DEVICE — SOLUTION IRRIG 250ML 0.9% SOD CHL USP POUR PLAS BTL

## (undated) DEVICE — BANDAGE,ELASTIC,ESMARK,STERILE,4"X9',LF: Brand: MEDLINE

## (undated) DEVICE — BLADE,STAINLESS-STEEL,15,STRL,DISPOSABLE: Brand: MEDLINE

## (undated) DEVICE — GAUZE,SPONGE,4"X4",16PLY,XRAY,STRL,LF: Brand: MEDLINE

## (undated) DEVICE — DRESSING GZ W1XL8IN COT XRFRM N ADH OVERWRAP CURAD

## (undated) DEVICE — PADDING,UNDERCAST,COTTON, 3X4YD STERILE: Brand: MEDLINE

## (undated) DEVICE — GOWN,SIRUS,FABRNF,XL,20/CS: Brand: MEDLINE

## (undated) DEVICE — MARKER,SKIN,WI/RULER AND LABELS: Brand: MEDLINE

## (undated) DEVICE — NDL CNTR 40CT FM MAG: Brand: MEDLINE INDUSTRIES, INC.

## (undated) DEVICE — SHEET,DRAPE,40X58,STERILE: Brand: MEDLINE

## (undated) DEVICE — SYRINGE 20ML LL S/C 50

## (undated) DEVICE — COVER,LIGHT HANDLE,FLX,1/PK: Brand: MEDLINE INDUSTRIES, INC.

## (undated) DEVICE — SOLUTION SURG PREP 26 CC PURPREP

## (undated) DEVICE — WIPES SKIN CLOTH READYPREP 9 X 10.5 IN 2% CHLORHEX GLUCONATE CHG PREOP

## (undated) DEVICE — COVER HNDL LT DISP